# Patient Record
Sex: FEMALE | Race: BLACK OR AFRICAN AMERICAN | NOT HISPANIC OR LATINO
[De-identification: names, ages, dates, MRNs, and addresses within clinical notes are randomized per-mention and may not be internally consistent; named-entity substitution may affect disease eponyms.]

---

## 2018-02-26 PROBLEM — Z00.00 ENCOUNTER FOR PREVENTIVE HEALTH EXAMINATION: Status: ACTIVE | Noted: 2018-02-26

## 2018-03-01 ENCOUNTER — APPOINTMENT (OUTPATIENT)
Dept: ULTRASOUND IMAGING | Facility: CLINIC | Age: 28
End: 2018-03-01
Payer: MEDICAID

## 2018-03-01 ENCOUNTER — OUTPATIENT (OUTPATIENT)
Dept: OUTPATIENT SERVICES | Facility: HOSPITAL | Age: 28
LOS: 1 days | End: 2018-03-01
Payer: MEDICAID

## 2018-03-01 DIAGNOSIS — Z00.8 ENCOUNTER FOR OTHER GENERAL EXAMINATION: ICD-10-CM

## 2018-03-01 PROCEDURE — 93970 EXTREMITY STUDY: CPT | Mod: 26

## 2018-03-01 PROCEDURE — 93970 EXTREMITY STUDY: CPT

## 2019-06-11 ENCOUNTER — NON-APPOINTMENT (OUTPATIENT)
Age: 29
End: 2019-06-11

## 2019-06-11 ENCOUNTER — RECORD ABSTRACTING (OUTPATIENT)
Age: 29
End: 2019-06-11

## 2019-06-11 DIAGNOSIS — Z82.69 FAMILY HISTORY OF OTHER DISEASES OF THE MUSCULOSKELETAL SYSTEM AND CONNECTIVE TISSUE: ICD-10-CM

## 2019-06-11 DIAGNOSIS — Z87.42 PERSONAL HISTORY OF OTHER DISEASES OF THE FEMALE GENITAL TRACT: ICD-10-CM

## 2019-06-11 DIAGNOSIS — Z82.49 FAMILY HISTORY OF ISCHEMIC HEART DISEASE AND OTHER DISEASES OF THE CIRCULATORY SYSTEM: ICD-10-CM

## 2019-06-11 DIAGNOSIS — Z78.9 OTHER SPECIFIED HEALTH STATUS: ICD-10-CM

## 2019-06-11 DIAGNOSIS — Z83.3 FAMILY HISTORY OF DIABETES MELLITUS: ICD-10-CM

## 2019-06-11 RX ORDER — LABETALOL HYDROCHLORIDE 200 MG/1
200 TABLET, FILM COATED ORAL
Qty: 60 | Refills: 2 | Status: ACTIVE | COMMUNITY
Start: 2019-06-11

## 2019-06-11 RX ORDER — PNV NO.95/FERROUS FUM/FOLIC AC 28MG-0.8MG
27-0.8 TABLET ORAL
Refills: 0 | Status: ACTIVE | COMMUNITY
Start: 2019-06-11

## 2019-06-16 ENCOUNTER — LABORATORY RESULT (OUTPATIENT)
Age: 29
End: 2019-06-16

## 2019-06-17 ENCOUNTER — LABORATORY RESULT (OUTPATIENT)
Age: 29
End: 2019-06-17

## 2019-06-17 ENCOUNTER — APPOINTMENT (OUTPATIENT)
Dept: MATERNAL FETAL MEDICINE | Facility: CLINIC | Age: 29
End: 2019-06-17
Payer: COMMERCIAL

## 2019-06-17 ENCOUNTER — NON-APPOINTMENT (OUTPATIENT)
Age: 29
End: 2019-06-17

## 2019-06-17 ENCOUNTER — OUTPATIENT (OUTPATIENT)
Dept: OUTPATIENT SERVICES | Facility: HOSPITAL | Age: 29
LOS: 1 days | End: 2019-06-17
Payer: SELF-PAY

## 2019-06-17 ENCOUNTER — APPOINTMENT (OUTPATIENT)
Dept: ANTEPARTUM | Facility: CLINIC | Age: 29
End: 2019-06-17
Payer: COMMERCIAL

## 2019-06-17 ENCOUNTER — ASOB RESULT (OUTPATIENT)
Age: 29
End: 2019-06-17

## 2019-06-17 VITALS
BODY MASS INDEX: 45.99 KG/M2 | HEIGHT: 67 IN | SYSTOLIC BLOOD PRESSURE: 138 MMHG | DIASTOLIC BLOOD PRESSURE: 90 MMHG | WEIGHT: 293 LBS

## 2019-06-17 DIAGNOSIS — I89.0 LYMPHEDEMA, NOT ELSEWHERE CLASSIFIED: ICD-10-CM

## 2019-06-17 DIAGNOSIS — O09.899 SUPERVISION OF OTHER HIGH RISK PREGNANCIES, UNSPECIFIED TRIMESTER: ICD-10-CM

## 2019-06-17 PROCEDURE — 99203 OFFICE O/P NEW LOW 30 MIN: CPT | Mod: GE,25

## 2019-06-17 PROCEDURE — 81003 URINALYSIS AUTO W/O SCOPE: CPT | Mod: NC,QW

## 2019-06-17 PROCEDURE — 76802 OB US < 14 WKS ADDL FETUS: CPT | Mod: 26

## 2019-06-17 PROCEDURE — 83020 HEMOGLOBIN ELECTROPHORESIS: CPT | Mod: 26

## 2019-06-17 PROCEDURE — 76801 OB US < 14 WKS SINGLE FETUS: CPT | Mod: 26

## 2019-06-17 RX ORDER — METOCLOPRAMIDE 10 MG/1
10 TABLET ORAL
Qty: 90 | Refills: 1 | Status: ACTIVE | COMMUNITY
Start: 2019-06-17 | End: 1900-01-01

## 2019-06-18 ENCOUNTER — LABORATORY RESULT (OUTPATIENT)
Age: 29
End: 2019-06-18

## 2019-06-18 LAB
ALBUMIN SERPL ELPH-MCNC: 4 G/DL — SIGNIFICANT CHANGE UP (ref 3.3–5)
ALP SERPL-CCNC: 104 U/L — SIGNIFICANT CHANGE UP (ref 40–120)
ALT FLD-CCNC: 10 U/L — SIGNIFICANT CHANGE UP (ref 10–45)
ANION GAP SERPL CALC-SCNC: 16 MMOL/L — SIGNIFICANT CHANGE UP (ref 5–17)
AST SERPL-CCNC: 14 U/L — SIGNIFICANT CHANGE UP (ref 10–40)
BILIRUB SERPL-MCNC: 0.2 MG/DL — SIGNIFICANT CHANGE UP (ref 0.2–1.2)
BUN SERPL-MCNC: 7 MG/DL — SIGNIFICANT CHANGE UP (ref 7–23)
C TRACH RRNA SPEC QL NAA+PROBE: SIGNIFICANT CHANGE UP
CALCIUM SERPL-MCNC: 9.6 MG/DL — SIGNIFICANT CHANGE UP (ref 8.4–10.5)
CHLORIDE SERPL-SCNC: 101 MMOL/L — SIGNIFICANT CHANGE UP (ref 96–108)
CO2 SERPL-SCNC: 19 MMOL/L — LOW (ref 22–31)
CREAT ?TM UR-MCNC: 128 MG/DL — SIGNIFICANT CHANGE UP
CREAT SERPL-MCNC: 0.8 MG/DL — SIGNIFICANT CHANGE UP (ref 0.5–1.3)
GLUCOSE SERPL-MCNC: 75 MG/DL — SIGNIFICANT CHANGE UP (ref 70–99)
HBV SURFACE AG SER-ACNC: SIGNIFICANT CHANGE UP
HCT VFR BLD CALC: 38.8 % — SIGNIFICANT CHANGE UP (ref 34.5–45)
HCV AB S/CO SERPL IA: 0.1 S/CO — SIGNIFICANT CHANGE UP (ref 0–0.99)
HCV AB SERPL-IMP: SIGNIFICANT CHANGE UP
HGB BLD-MCNC: 12.5 G/DL — SIGNIFICANT CHANGE UP (ref 11.5–15.5)
HIV 1+2 AB+HIV1 P24 AG SERPL QL IA: SIGNIFICANT CHANGE UP
LDH SERPL L TO P-CCNC: 214 U/L — SIGNIFICANT CHANGE UP (ref 50–242)
MCHC RBC-ENTMCNC: 29.8 PG — SIGNIFICANT CHANGE UP (ref 27–34)
MCHC RBC-ENTMCNC: 32.2 GM/DL — SIGNIFICANT CHANGE UP (ref 32–36)
MCV RBC AUTO: 92.4 FL — SIGNIFICANT CHANGE UP (ref 80–100)
MEV IGG SER-ACNC: 106 AU/ML — SIGNIFICANT CHANGE UP
MEV IGG+IGM SER-IMP: POSITIVE — SIGNIFICANT CHANGE UP
N GONORRHOEA RRNA SPEC QL NAA+PROBE: SIGNIFICANT CHANGE UP
PLATELET # BLD AUTO: 349 K/UL — SIGNIFICANT CHANGE UP (ref 150–400)
POTASSIUM SERPL-MCNC: 4.3 MMOL/L — SIGNIFICANT CHANGE UP (ref 3.5–5.3)
POTASSIUM SERPL-SCNC: 4.3 MMOL/L — SIGNIFICANT CHANGE UP (ref 3.5–5.3)
PROT ?TM UR-MCNC: 14 MG/DL — HIGH (ref 0–12)
PROT SERPL-MCNC: 6.8 G/DL — SIGNIFICANT CHANGE UP (ref 6–8.3)
PROT/CREAT UR-RTO: 0.1 RATIO — SIGNIFICANT CHANGE UP (ref 0–0.2)
RBC # BLD: 4.2 M/UL — SIGNIFICANT CHANGE UP (ref 3.8–5.2)
RBC # FLD: 13.4 % — SIGNIFICANT CHANGE UP (ref 10.3–14.5)
RUBV IGG SER-ACNC: 2.4 INDEX — SIGNIFICANT CHANGE UP
RUBV IGG SER-IMP: POSITIVE — SIGNIFICANT CHANGE UP
SODIUM SERPL-SCNC: 136 MMOL/L — SIGNIFICANT CHANGE UP (ref 135–145)
SPECIMEN SOURCE: SIGNIFICANT CHANGE UP
T PALLIDUM AB TITR SER: NEGATIVE — SIGNIFICANT CHANGE UP
TSH SERPL-MCNC: 0.03 UIU/ML — LOW (ref 0.27–4.2)
URATE SERPL-MCNC: 3.3 MG/DL — SIGNIFICANT CHANGE UP (ref 2.5–7)
VZV IGG FLD QL IA: 200.7 INDEX — SIGNIFICANT CHANGE UP
VZV IGG FLD QL IA: POSITIVE — SIGNIFICANT CHANGE UP
WBC # BLD: 16.31 K/UL — HIGH (ref 3.8–10.5)
WBC # FLD AUTO: 16.31 K/UL — HIGH (ref 3.8–10.5)

## 2019-06-18 PROCEDURE — 84156 ASSAY OF PROTEIN URINE: CPT

## 2019-06-18 PROCEDURE — 87340 HEPATITIS B SURFACE AG IA: CPT

## 2019-06-18 PROCEDURE — 81003 URINALYSIS AUTO W/O SCOPE: CPT

## 2019-06-18 PROCEDURE — 81220 CFTR GENE COM VARIANTS: CPT

## 2019-06-18 PROCEDURE — 87086 URINE CULTURE/COLONY COUNT: CPT

## 2019-06-18 PROCEDURE — 83655 ASSAY OF LEAD: CPT

## 2019-06-18 PROCEDURE — 86803 HEPATITIS C AB TEST: CPT

## 2019-06-18 PROCEDURE — G0463: CPT

## 2019-06-18 PROCEDURE — 80053 COMPREHEN METABOLIC PANEL: CPT

## 2019-06-18 PROCEDURE — 87491 CHLMYD TRACH DNA AMP PROBE: CPT

## 2019-06-18 PROCEDURE — 85027 COMPLETE CBC AUTOMATED: CPT

## 2019-06-18 PROCEDURE — 84550 ASSAY OF BLOOD/URIC ACID: CPT

## 2019-06-18 PROCEDURE — 86780 TREPONEMA PALLIDUM: CPT

## 2019-06-18 PROCEDURE — 86765 RUBEOLA ANTIBODY: CPT

## 2019-06-18 PROCEDURE — 84443 ASSAY THYROID STIM HORMONE: CPT

## 2019-06-18 PROCEDURE — 87389 HIV-1 AG W/HIV-1&-2 AB AG IA: CPT

## 2019-06-18 PROCEDURE — 86900 BLOOD TYPING SEROLOGIC ABO: CPT

## 2019-06-18 PROCEDURE — 86480 TB TEST CELL IMMUN MEASURE: CPT

## 2019-06-18 PROCEDURE — 83020 HEMOGLOBIN ELECTROPHORESIS: CPT

## 2019-06-18 PROCEDURE — 83615 LACTATE (LD) (LDH) ENZYME: CPT

## 2019-06-18 PROCEDURE — 81329 SMN1 GENE DOS/DELETION ALYS: CPT

## 2019-06-18 PROCEDURE — 86787 VARICELLA-ZOSTER ANTIBODY: CPT

## 2019-06-18 PROCEDURE — 36415 COLL VENOUS BLD VENIPUNCTURE: CPT

## 2019-06-18 PROCEDURE — 81243 FMR1 GEN ALY DETC ABNL ALLEL: CPT

## 2019-06-18 PROCEDURE — 82570 ASSAY OF URINE CREATININE: CPT

## 2019-06-18 PROCEDURE — 86762 RUBELLA ANTIBODY: CPT

## 2019-06-18 PROCEDURE — 86850 RBC ANTIBODY SCREEN: CPT

## 2019-06-18 PROCEDURE — 87591 N.GONORRHOEAE DNA AMP PROB: CPT

## 2019-06-19 LAB
CULTURE RESULTS: SIGNIFICANT CHANGE UP
GAMMA INTERFERON BACKGROUND BLD IA-ACNC: 0.02 IU/ML — SIGNIFICANT CHANGE UP
HEMOGLOBIN INTERPRETATION: SIGNIFICANT CHANGE UP
HGB A MFR BLD: 98 % — SIGNIFICANT CHANGE UP (ref 95.8–98)
HGB A2 MFR BLD: 2 % — SIGNIFICANT CHANGE UP (ref 2–3.2)
LEAD BLD-MCNC: <1 UG/DL — SIGNIFICANT CHANGE UP (ref 0–4)
M TB IFN-G BLD-IMP: NEGATIVE — SIGNIFICANT CHANGE UP
M TB IFN-G CD4+ BCKGRND COR BLD-ACNC: 0.02 IU/ML — SIGNIFICANT CHANGE UP
M TB IFN-G CD4+CD8+ BCKGRND COR BLD-ACNC: 0.01 IU/ML — SIGNIFICANT CHANGE UP
QUANT TB PLUS MITOGEN MINUS NIL: 8.47 IU/ML — SIGNIFICANT CHANGE UP
SPECIMEN SOURCE: SIGNIFICANT CHANGE UP

## 2019-06-20 LAB — CYTOLOGY SPEC DOC CYTO: SIGNIFICANT CHANGE UP

## 2019-06-21 LAB — FRAGILE X PROTEIN (FMRP) PNL BLD: SIGNIFICANT CHANGE UP

## 2019-06-22 LAB
CYSTIC FIBROSIS EXPANDED PANEL: SIGNIFICANT CHANGE UP
SPINAL MUSCULAR ATROPHY: NEGATIVE — SIGNIFICANT CHANGE UP

## 2019-06-24 DIAGNOSIS — O10.919 UNSPECIFIED PRE-EXISTING HYPERTENSION COMPLICATING PREGNANCY, UNSPECIFIED TRIMESTER: ICD-10-CM

## 2019-06-24 DIAGNOSIS — O21.9 VOMITING OF PREGNANCY, UNSPECIFIED: ICD-10-CM

## 2019-06-30 ENCOUNTER — LABORATORY RESULT (OUTPATIENT)
Age: 29
End: 2019-06-30

## 2019-07-01 ENCOUNTER — LABORATORY RESULT (OUTPATIENT)
Age: 29
End: 2019-07-01

## 2019-07-01 ENCOUNTER — NON-APPOINTMENT (OUTPATIENT)
Age: 29
End: 2019-07-01

## 2019-07-01 ENCOUNTER — OUTPATIENT (OUTPATIENT)
Dept: OUTPATIENT SERVICES | Facility: HOSPITAL | Age: 29
LOS: 1 days | End: 2019-07-01
Payer: SELF-PAY

## 2019-07-01 ENCOUNTER — ASOB RESULT (OUTPATIENT)
Age: 29
End: 2019-07-01

## 2019-07-01 ENCOUNTER — APPOINTMENT (OUTPATIENT)
Dept: MATERNAL FETAL MEDICINE | Facility: CLINIC | Age: 29
End: 2019-07-01
Payer: COMMERCIAL

## 2019-07-01 ENCOUNTER — APPOINTMENT (OUTPATIENT)
Dept: ANTEPARTUM | Facility: CLINIC | Age: 29
End: 2019-07-01
Payer: MEDICAID

## 2019-07-01 VITALS
HEIGHT: 67 IN | WEIGHT: 293 LBS | SYSTOLIC BLOOD PRESSURE: 130 MMHG | DIASTOLIC BLOOD PRESSURE: 88 MMHG | BODY MASS INDEX: 45.99 KG/M2

## 2019-07-01 DIAGNOSIS — O10.919 UNSPECIFIED PRE-EXISTING HYPERTENSION COMPLICATING PREGNANCY, UNSPECIFIED TRIMESTER: ICD-10-CM

## 2019-07-01 DIAGNOSIS — O09.899 SUPERVISION OF OTHER HIGH RISK PREGNANCIES, UNSPECIFIED TRIMESTER: ICD-10-CM

## 2019-07-01 LAB
BILIRUB UR QL STRIP: NORMAL
GLUCOSE UR-MCNC: NORMAL
HCG UR QL: 0.2 EU/DL
HGB UR QL STRIP.AUTO: NORMAL
KETONES UR-MCNC: NORMAL
LEUKOCYTE ESTERASE UR QL STRIP: NORMAL
NITRITE UR QL STRIP: NORMAL
PH UR STRIP: 6
PROT UR STRIP-MCNC: NORMAL
SP GR UR STRIP: 1.02

## 2019-07-01 PROCEDURE — 82677 ASSAY OF ESTRIOL: CPT

## 2019-07-01 PROCEDURE — 76814 OB US NUCHAL MEAS ADD-ON: CPT | Mod: 26

## 2019-07-01 PROCEDURE — 84443 ASSAY THYROID STIM HORMONE: CPT

## 2019-07-01 PROCEDURE — 36416 COLLJ CAPILLARY BLOOD SPEC: CPT

## 2019-07-01 PROCEDURE — 76802 OB US < 14 WKS ADDL FETUS: CPT | Mod: 26

## 2019-07-01 PROCEDURE — 99213 OFFICE O/P EST LOW 20 MIN: CPT | Mod: GE,25

## 2019-07-01 PROCEDURE — 82565 ASSAY OF CREATININE: CPT

## 2019-07-01 PROCEDURE — 84702 CHORIONIC GONADOTROPIN TEST: CPT

## 2019-07-01 PROCEDURE — 84156 ASSAY OF PROTEIN URINE: CPT

## 2019-07-01 PROCEDURE — 86336 INHIBIN A: CPT

## 2019-07-01 PROCEDURE — G0463: CPT

## 2019-07-01 PROCEDURE — 76801 OB US < 14 WKS SINGLE FETUS: CPT | Mod: 26

## 2019-07-01 PROCEDURE — 82950 GLUCOSE TEST: CPT

## 2019-07-01 PROCEDURE — 82575 CREATININE CLEARANCE TEST: CPT

## 2019-07-01 PROCEDURE — 36415 COLL VENOUS BLD VENIPUNCTURE: CPT

## 2019-07-01 PROCEDURE — 84704 HCG FREE BETACHAIN TEST: CPT

## 2019-07-01 PROCEDURE — 82105 ALPHA-FETOPROTEIN SERUM: CPT

## 2019-07-01 PROCEDURE — 81003 URINALYSIS AUTO W/O SCOPE: CPT

## 2019-07-01 PROCEDURE — 80053 COMPREHEN METABOLIC PANEL: CPT

## 2019-07-01 PROCEDURE — 87086 URINE CULTURE/COLONY COUNT: CPT

## 2019-07-01 PROCEDURE — 76813 OB US NUCHAL MEAS 1 GEST: CPT | Mod: 26

## 2019-07-02 ENCOUNTER — LABORATORY RESULT (OUTPATIENT)
Age: 29
End: 2019-07-02

## 2019-07-02 LAB
ALBUMIN SERPL ELPH-MCNC: 4.1 G/DL — SIGNIFICANT CHANGE UP (ref 3.3–5)
ALP SERPL-CCNC: 103 U/L — SIGNIFICANT CHANGE UP (ref 40–120)
ALT FLD-CCNC: 12 U/L — SIGNIFICANT CHANGE UP (ref 10–45)
ANION GAP SERPL CALC-SCNC: 14 MMOL/L — SIGNIFICANT CHANGE UP (ref 5–17)
AST SERPL-CCNC: 17 U/L — SIGNIFICANT CHANGE UP (ref 10–40)
BILIRUB SERPL-MCNC: <0.2 MG/DL — SIGNIFICANT CHANGE UP (ref 0.2–1.2)
BODY SURFACE AREA CALCULATION: 1.73 M2 — SIGNIFICANT CHANGE UP
BUN SERPL-MCNC: 6 MG/DL — LOW (ref 7–23)
CALCIUM SERPL-MCNC: 9.8 MG/DL — SIGNIFICANT CHANGE UP (ref 8.4–10.5)
CHLORIDE SERPL-SCNC: 103 MMOL/L — SIGNIFICANT CHANGE UP (ref 96–108)
CO2 SERPL-SCNC: 18 MMOL/L — LOW (ref 22–31)
COLLECT DURATION TIME UR: 24 HR — SIGNIFICANT CHANGE UP
COLLECT DURATION TIME UR: 24 HR — SIGNIFICANT CHANGE UP
CREAT CL ?TM UR+SERPL-VRATE: 187 ML/MIN — HIGH (ref 75–115)
CREAT SERPL-MCNC: 0.75 MG/DL — SIGNIFICANT CHANGE UP (ref 0.5–1.3)
GLUCOSE 1H P MEAL SERPL-MCNC: 99 MG/DL — SIGNIFICANT CHANGE UP (ref 70–134)
GLUCOSE SERPL-MCNC: 98 MG/DL — SIGNIFICANT CHANGE UP (ref 70–99)
POTASSIUM SERPL-MCNC: 4.3 MMOL/L — SIGNIFICANT CHANGE UP (ref 3.5–5.3)
POTASSIUM SERPL-SCNC: 4.3 MMOL/L — SIGNIFICANT CHANGE UP (ref 3.5–5.3)
PROT 24H UR-MRATE: 231 MG/24 H — HIGH (ref 50–100)
PROT SERPL-MCNC: 6.7 G/DL — SIGNIFICANT CHANGE UP (ref 6–8.3)
SODIUM SERPL-SCNC: 135 MMOL/L — SIGNIFICANT CHANGE UP (ref 135–145)
TOTAL VOLUME - 24 HOUR: 1775 ML — SIGNIFICANT CHANGE UP
TOTAL VOLUME - 24 HOUR: 1775 ML — SIGNIFICANT CHANGE UP
TSH SERPL-MCNC: 0.28 UIU/ML — SIGNIFICANT CHANGE UP (ref 0.27–4.2)
URINE CREATININE CALCULATION: 2 G/24 H — HIGH (ref 0.8–1.8)
URINE CREATININE CALCULATION: 2 G/24 H — HIGH (ref 0.8–1.8)

## 2019-07-03 ENCOUNTER — TRANSCRIPTION ENCOUNTER (OUTPATIENT)
Age: 29
End: 2019-07-03

## 2019-07-03 LAB
1ST TRIMESTER DATA: SIGNIFICANT CHANGE UP
ADDENDUM DOC: SIGNIFICANT CHANGE UP
AFP AMN-MCNC: SIGNIFICANT CHANGE UP
B-HCG FREE SERPL-MCNC: SIGNIFICANT CHANGE UP
CLINICAL BIOCHEMIST REVIEW: SIGNIFICANT CHANGE UP
CULTURE RESULTS: SIGNIFICANT CHANGE UP
DEMOGRAPHIC DATA: SIGNIFICANT CHANGE UP
NASAL BONE - TWINB: PRESENT — SIGNIFICANT CHANGE UP
NASAL BONE: PRESENT — SIGNIFICANT CHANGE UP
NT - TWINB: SIGNIFICANT CHANGE UP
NT: SIGNIFICANT CHANGE UP
PAPP-A SERPL-ACNC: SIGNIFICANT CHANGE UP
SCREEN - COMMENTS TWIN B: SIGNIFICANT CHANGE UP
SCREEN - FOOTER TWIN B: SIGNIFICANT CHANGE UP
SCREEN - RECOMMENDATIONSTWIN B: SIGNIFICANT CHANGE UP
SCREEN-FOOTER: SIGNIFICANT CHANGE UP
SCREEN-RECOMMENDATIONS: SIGNIFICANT CHANGE UP
SPECIMEN SOURCE: SIGNIFICANT CHANGE UP

## 2019-07-09 ENCOUNTER — APPOINTMENT (OUTPATIENT)
Dept: CV DIAGNOSITCS | Facility: HOSPITAL | Age: 29
End: 2019-07-09

## 2019-07-09 ENCOUNTER — OUTPATIENT (OUTPATIENT)
Dept: OUTPATIENT SERVICES | Facility: HOSPITAL | Age: 29
LOS: 1 days | End: 2019-07-09
Payer: SELF-PAY

## 2019-07-09 DIAGNOSIS — O09.899 SUPERVISION OF OTHER HIGH RISK PREGNANCIES, UNSPECIFIED TRIMESTER: ICD-10-CM

## 2019-07-09 PROCEDURE — 93306 TTE W/DOPPLER COMPLETE: CPT

## 2019-07-09 PROCEDURE — 93306 TTE W/DOPPLER COMPLETE: CPT | Mod: 26

## 2019-07-15 ENCOUNTER — LABORATORY RESULT (OUTPATIENT)
Age: 29
End: 2019-07-15

## 2019-07-15 ENCOUNTER — OUTPATIENT (OUTPATIENT)
Dept: OUTPATIENT SERVICES | Facility: HOSPITAL | Age: 29
LOS: 1 days | End: 2019-07-15
Payer: SELF-PAY

## 2019-07-15 ENCOUNTER — APPOINTMENT (OUTPATIENT)
Dept: MATERNAL FETAL MEDICINE | Facility: CLINIC | Age: 29
End: 2019-07-15
Payer: COMMERCIAL

## 2019-07-15 ENCOUNTER — NON-APPOINTMENT (OUTPATIENT)
Age: 29
End: 2019-07-15

## 2019-07-15 DIAGNOSIS — O09.899 SUPERVISION OF OTHER HIGH RISK PREGNANCIES, UNSPECIFIED TRIMESTER: ICD-10-CM

## 2019-07-15 PROCEDURE — 99213 OFFICE O/P EST LOW 20 MIN: CPT | Mod: GE,25

## 2019-07-15 PROCEDURE — 86146 BETA-2 GLYCOPROTEIN ANTIBODY: CPT

## 2019-07-15 PROCEDURE — 36415 COLL VENOUS BLD VENIPUNCTURE: CPT

## 2019-07-15 PROCEDURE — G0463: CPT

## 2019-07-15 PROCEDURE — 85610 PROTHROMBIN TIME: CPT

## 2019-07-15 PROCEDURE — 81003 URINALYSIS AUTO W/O SCOPE: CPT

## 2019-07-15 PROCEDURE — 85598 HEXAGNAL PHOSPH PLTLT NEUTRL: CPT

## 2019-07-15 PROCEDURE — 36415 COLL VENOUS BLD VENIPUNCTURE: CPT | Mod: NC

## 2019-07-15 PROCEDURE — 86147 CARDIOLIPIN ANTIBODY EA IG: CPT

## 2019-07-15 PROCEDURE — 85730 THROMBOPLASTIN TIME PARTIAL: CPT

## 2019-07-15 PROCEDURE — 85613 RUSSELL VIPER VENOM DILUTED: CPT

## 2019-07-15 RX ORDER — PYRIDOXINE HCL (VITAMIN B6) 50 MG
50 TABLET ORAL DAILY
Qty: 30 | Refills: 2 | Status: ACTIVE | COMMUNITY
Start: 2019-06-17 | End: 1900-01-01

## 2019-07-16 LAB
APTT BLD: 29.7 SEC — SIGNIFICANT CHANGE UP (ref 27.5–36.3)
B2 GLYCOPROT1 AB SER QL: POSITIVE
CARDIOLIPIN AB SER-ACNC: NEGATIVE — SIGNIFICANT CHANGE UP
DRVVT SCREEN TO CONFIRM RATIO: SIGNIFICANT CHANGE UP
INR BLD: 1 RATIO — SIGNIFICANT CHANGE UP (ref 0.88–1.16)
LA NT DPL PPP QL: 29.1 SEC — SIGNIFICANT CHANGE UP
NORMALIZED SCT PPP-RTO: 0.96 RATIO — SIGNIFICANT CHANGE UP (ref 0–1.16)
NORMALIZED SCT PPP-RTO: SIGNIFICANT CHANGE UP
PROTHROM AB SERPL-ACNC: 11.4 SEC — SIGNIFICANT CHANGE UP (ref 10–13.1)

## 2019-07-17 ENCOUNTER — NON-APPOINTMENT (OUTPATIENT)
Age: 29
End: 2019-07-17

## 2019-07-17 LAB
B2 GLYCOPROT1 IGA SER QL: <5 SAU — SIGNIFICANT CHANGE UP
B2 GLYCOPROT1 IGG SER-ACNC: 13.5 SGU — SIGNIFICANT CHANGE UP
B2 GLYCOPROT1 IGM SER-ACNC: 6.4 SMU — SIGNIFICANT CHANGE UP

## 2019-07-22 DIAGNOSIS — O30.042 TWIN PREGNANCY, DICHORIONIC/DIAMNIOTIC, SECOND TRIMESTER: ICD-10-CM

## 2019-07-22 DIAGNOSIS — O09.92 SUPERVISION OF HIGH RISK PREGNANCY, UNSPECIFIED, SECOND TRIMESTER: ICD-10-CM

## 2019-07-22 DIAGNOSIS — O34.219 MATERNAL CARE FOR UNSPECIFIED TYPE SCAR FROM PREVIOUS CESAREAN DELIVERY: ICD-10-CM

## 2019-07-28 ENCOUNTER — LABORATORY RESULT (OUTPATIENT)
Age: 29
End: 2019-07-28

## 2019-07-29 ENCOUNTER — APPOINTMENT (OUTPATIENT)
Dept: MATERNAL FETAL MEDICINE | Facility: CLINIC | Age: 29
End: 2019-07-29
Payer: COMMERCIAL

## 2019-07-29 ENCOUNTER — OUTPATIENT (OUTPATIENT)
Dept: OUTPATIENT SERVICES | Facility: HOSPITAL | Age: 29
LOS: 1 days | End: 2019-07-29
Payer: SELF-PAY

## 2019-07-29 ENCOUNTER — ASOB RESULT (OUTPATIENT)
Age: 29
End: 2019-07-29

## 2019-07-29 ENCOUNTER — NON-APPOINTMENT (OUTPATIENT)
Age: 29
End: 2019-07-29

## 2019-07-29 ENCOUNTER — APPOINTMENT (OUTPATIENT)
Dept: ANTEPARTUM | Facility: CLINIC | Age: 29
End: 2019-07-29
Payer: COMMERCIAL

## 2019-07-29 VITALS
DIASTOLIC BLOOD PRESSURE: 82 MMHG | SYSTOLIC BLOOD PRESSURE: 120 MMHG | BODY MASS INDEX: 45.99 KG/M2 | HEART RATE: 68 BPM | HEIGHT: 67 IN | WEIGHT: 293 LBS | OXYGEN SATURATION: 98 %

## 2019-07-29 DIAGNOSIS — O09.899 SUPERVISION OF OTHER HIGH RISK PREGNANCIES, UNSPECIFIED TRIMESTER: ICD-10-CM

## 2019-07-29 DIAGNOSIS — N76.0 ACUTE VAGINITIS: ICD-10-CM

## 2019-07-29 LAB
BILIRUB UR QL STRIP: NORMAL
GLUCOSE UR-MCNC: NORMAL
HCG UR QL: 0.2 EU/DL
HGB UR QL STRIP.AUTO: NORMAL
KETONES UR-MCNC: NORMAL
LEUKOCYTE ESTERASE UR QL STRIP: NORMAL
NITRITE UR QL STRIP: NORMAL
PH UR STRIP: 6
PROT UR STRIP-MCNC: 30
SP GR UR STRIP: 1.03

## 2019-07-29 PROCEDURE — 82677 ASSAY OF ESTRIOL: CPT

## 2019-07-29 PROCEDURE — 76805 OB US >/= 14 WKS SNGL FETUS: CPT

## 2019-07-29 PROCEDURE — 84702 CHORIONIC GONADOTROPIN TEST: CPT

## 2019-07-29 PROCEDURE — 76810 OB US >/= 14 WKS ADDL FETUS: CPT | Mod: 26

## 2019-07-29 PROCEDURE — 76810 OB US >/= 14 WKS ADDL FETUS: CPT

## 2019-07-29 PROCEDURE — 99213 OFFICE O/P EST LOW 20 MIN: CPT | Mod: 25

## 2019-07-29 PROCEDURE — 84704 HCG FREE BETACHAIN TEST: CPT

## 2019-07-29 PROCEDURE — 76805 OB US >/= 14 WKS SNGL FETUS: CPT | Mod: 26

## 2019-07-29 PROCEDURE — G0463: CPT

## 2019-07-29 PROCEDURE — 36415 COLL VENOUS BLD VENIPUNCTURE: CPT

## 2019-07-29 PROCEDURE — 76817 TRANSVAGINAL US OBSTETRIC: CPT

## 2019-07-29 PROCEDURE — 86336 INHIBIN A: CPT

## 2019-07-29 PROCEDURE — 81003 URINALYSIS AUTO W/O SCOPE: CPT

## 2019-07-29 PROCEDURE — 76817 TRANSVAGINAL US OBSTETRIC: CPT | Mod: 26

## 2019-07-29 PROCEDURE — 82105 ALPHA-FETOPROTEIN SERUM: CPT

## 2019-07-29 RX ORDER — FOLIC ACID 1 MG/1
1 TABLET ORAL
Qty: 30 | Refills: 5 | Status: ACTIVE | COMMUNITY
Start: 2019-07-29 | End: 1900-01-01

## 2019-07-29 RX ORDER — AMOXICILLIN AND CLAVULANATE POTASSIUM 875; 125 MG/1; MG/1
875-125 TABLET, COATED ORAL
Qty: 14 | Refills: 0 | Status: DISCONTINUED | COMMUNITY
Start: 2019-07-15 | End: 2019-07-29

## 2019-07-29 RX ORDER — PNV NO.95/FERROUS FUM/FOLIC AC 28MG-0.8MG
500-125 TABLET ORAL DAILY
Qty: 90 | Refills: 3 | Status: ACTIVE | COMMUNITY
Start: 2019-07-29 | End: 1900-01-01

## 2019-07-29 RX ORDER — CHOLECALCIFEROL (VITAMIN D3) 10(400)/ML
160 (50 FE) DROPS ORAL
Qty: 30 | Refills: 5 | Status: ACTIVE | COMMUNITY
Start: 2019-07-29 | End: 1900-01-01

## 2019-07-30 ENCOUNTER — NON-APPOINTMENT (OUTPATIENT)
Age: 29
End: 2019-07-30

## 2019-07-31 DIAGNOSIS — O10.012 PRE-EXISTING ESSENTIAL HYPERTENSION COMPLICATING PREGNANCY, SECOND TRIMESTER: ICD-10-CM

## 2019-07-31 DIAGNOSIS — O30.042 TWIN PREGNANCY, DICHORIONIC/DIAMNIOTIC, SECOND TRIMESTER: ICD-10-CM

## 2019-07-31 DIAGNOSIS — O34.219 MATERNAL CARE FOR UNSPECIFIED TYPE SCAR FROM PREVIOUS CESAREAN DELIVERY: ICD-10-CM

## 2019-08-01 DIAGNOSIS — N76.0 ACUTE VAGINITIS: ICD-10-CM

## 2019-08-06 LAB
1ST TRIMESTER DATA: SIGNIFICANT CHANGE UP
2ND TRIMESTER DATA: SIGNIFICANT CHANGE UP
ADDENDUM DOC: SIGNIFICANT CHANGE UP
AFP AMN-MCNC: SIGNIFICANT CHANGE UP
AFP SERPL-ACNC: SIGNIFICANT CHANGE UP
B-HCG FREE SERPL-MCNC: SIGNIFICANT CHANGE UP
B-HCG FREE SERPL-MCNC: SIGNIFICANT CHANGE UP
CLINICAL BIOCHEMIST REVIEW: SIGNIFICANT CHANGE UP
DEMOGRAPHIC DATA: SIGNIFICANT CHANGE UP
INHIBIN A SERPL-MCNC: SIGNIFICANT CHANGE UP
NASAL BONE - TWINB: PRESENT — SIGNIFICANT CHANGE UP
NASAL BONE: PRESENT — SIGNIFICANT CHANGE UP
NT - TWINB: SIGNIFICANT CHANGE UP
NT: SIGNIFICANT CHANGE UP
PAPP-A SERPL-ACNC: SIGNIFICANT CHANGE UP
SCREEN - COMMENTS TWIN B: SIGNIFICANT CHANGE UP
SCREEN - FOOTER TWIN B: SIGNIFICANT CHANGE UP
SCREEN - RECOMMENDATIONSTWIN B: SIGNIFICANT CHANGE UP
SCREEN-FOOTER: SIGNIFICANT CHANGE UP
SCREEN-RECOMMENDATIONS: SIGNIFICANT CHANGE UP
TOTAL ONTDS/VWD TWIN B: SIGNIFICANT CHANGE UP
U ESTRIOL SERPL-SCNC: SIGNIFICANT CHANGE UP

## 2019-08-11 ENCOUNTER — LABORATORY RESULT (OUTPATIENT)
Age: 29
End: 2019-08-11

## 2019-08-12 ENCOUNTER — APPOINTMENT (OUTPATIENT)
Dept: ANTEPARTUM | Facility: CLINIC | Age: 29
End: 2019-08-12
Payer: COMMERCIAL

## 2019-08-12 ENCOUNTER — OUTPATIENT (OUTPATIENT)
Dept: OUTPATIENT SERVICES | Facility: HOSPITAL | Age: 29
LOS: 1 days | End: 2019-08-12
Payer: SELF-PAY

## 2019-08-12 ENCOUNTER — APPOINTMENT (OUTPATIENT)
Dept: MATERNAL FETAL MEDICINE | Facility: CLINIC | Age: 29
End: 2019-08-12
Payer: COMMERCIAL

## 2019-08-12 ENCOUNTER — ASOB RESULT (OUTPATIENT)
Age: 29
End: 2019-08-12

## 2019-08-12 ENCOUNTER — NON-APPOINTMENT (OUTPATIENT)
Age: 29
End: 2019-08-12

## 2019-08-12 VITALS
DIASTOLIC BLOOD PRESSURE: 80 MMHG | BODY MASS INDEX: 45.99 KG/M2 | SYSTOLIC BLOOD PRESSURE: 110 MMHG | WEIGHT: 293 LBS | HEIGHT: 67 IN

## 2019-08-12 DIAGNOSIS — O09.899 SUPERVISION OF OTHER HIGH RISK PREGNANCIES, UNSPECIFIED TRIMESTER: ICD-10-CM

## 2019-08-12 PROCEDURE — 99213 OFFICE O/P EST LOW 20 MIN: CPT | Mod: 25

## 2019-08-12 PROCEDURE — 81003 URINALYSIS AUTO W/O SCOPE: CPT

## 2019-08-12 PROCEDURE — 76816 OB US FOLLOW-UP PER FETUS: CPT

## 2019-08-12 PROCEDURE — G0463: CPT

## 2019-08-12 PROCEDURE — 87140 CULTURE TYPE IMMUNOFLUORESC: CPT

## 2019-08-12 PROCEDURE — 76816 OB US FOLLOW-UP PER FETUS: CPT | Mod: 26,59

## 2019-08-12 PROCEDURE — 87255 GENET VIRUS ISOLATE HSV: CPT

## 2019-08-13 ENCOUNTER — NON-APPOINTMENT (OUTPATIENT)
Age: 29
End: 2019-08-13

## 2019-08-13 LAB
BILIRUB UR QL STRIP: NORMAL
GLUCOSE UR-MCNC: NORMAL
HCG UR QL: 0.2 EU/DL
HGB UR QL STRIP.AUTO: NORMAL
KETONES UR-MCNC: NORMAL
LEUKOCYTE ESTERASE UR QL STRIP: NORMAL
NITRITE UR QL STRIP: NORMAL
PH UR STRIP: 6.5
PROT UR STRIP-MCNC: NORMAL
SP GR UR STRIP: 1.02

## 2019-08-14 DIAGNOSIS — O30.049 TWIN PREGNANCY, DICHORIONIC/DIAMNIOTIC, UNSPECIFIED TRIMESTER: ICD-10-CM

## 2019-08-14 DIAGNOSIS — O10.919 UNSPECIFIED PRE-EXISTING HYPERTENSION COMPLICATING PREGNANCY, UNSPECIFIED TRIMESTER: ICD-10-CM

## 2019-08-15 LAB — HHV SPEC CULT: ABNORMAL

## 2019-08-19 ENCOUNTER — RX RENEWAL (OUTPATIENT)
Age: 29
End: 2019-08-19

## 2019-08-19 RX ORDER — VALACYCLOVIR 1 G/1
1 TABLET, FILM COATED ORAL
Qty: 14 | Refills: 3 | Status: ACTIVE | COMMUNITY
Start: 2019-08-19 | End: 1900-01-01

## 2019-08-20 ENCOUNTER — RESULT REVIEW (OUTPATIENT)
Age: 29
End: 2019-08-20

## 2019-08-22 DIAGNOSIS — Z3A.18 18 WEEKS GESTATION OF PREGNANCY: ICD-10-CM

## 2019-08-26 ENCOUNTER — APPOINTMENT (OUTPATIENT)
Dept: MATERNAL FETAL MEDICINE | Facility: CLINIC | Age: 29
End: 2019-08-26

## 2019-08-26 ENCOUNTER — ASOB RESULT (OUTPATIENT)
Age: 29
End: 2019-08-26

## 2019-08-26 ENCOUNTER — APPOINTMENT (OUTPATIENT)
Dept: ANTEPARTUM | Facility: CLINIC | Age: 29
End: 2019-08-26
Payer: SELF-PAY

## 2019-08-26 PROCEDURE — 76812 OB US DETAILED ADDL FETUS: CPT

## 2019-08-26 PROCEDURE — 76811 OB US DETAILED SNGL FETUS: CPT

## 2019-08-26 PROCEDURE — 76817 TRANSVAGINAL US OBSTETRIC: CPT

## 2019-09-09 ENCOUNTER — NON-APPOINTMENT (OUTPATIENT)
Age: 29
End: 2019-09-09

## 2019-09-09 ENCOUNTER — APPOINTMENT (OUTPATIENT)
Dept: ANTEPARTUM | Facility: CLINIC | Age: 29
End: 2019-09-09
Payer: SELF-PAY

## 2019-09-09 ENCOUNTER — ASOB RESULT (OUTPATIENT)
Age: 29
End: 2019-09-09

## 2019-09-09 ENCOUNTER — OUTPATIENT (OUTPATIENT)
Dept: OUTPATIENT SERVICES | Facility: HOSPITAL | Age: 29
LOS: 1 days | End: 2019-09-09
Payer: SELF-PAY

## 2019-09-09 ENCOUNTER — APPOINTMENT (OUTPATIENT)
Dept: MATERNAL FETAL MEDICINE | Facility: CLINIC | Age: 29
End: 2019-09-09
Payer: SELF-PAY

## 2019-09-09 ENCOUNTER — LABORATORY RESULT (OUTPATIENT)
Age: 29
End: 2019-09-09

## 2019-09-09 VITALS
BODY MASS INDEX: 45.99 KG/M2 | HEART RATE: 89 BPM | WEIGHT: 293 LBS | SYSTOLIC BLOOD PRESSURE: 122 MMHG | DIASTOLIC BLOOD PRESSURE: 82 MMHG | OXYGEN SATURATION: 97 % | HEIGHT: 67 IN

## 2019-09-09 DIAGNOSIS — O30.049 TWIN PREGNANCY, DICHORIONIC/DIAMNIOTIC, UNSPECIFIED TRIMESTER: ICD-10-CM

## 2019-09-09 DIAGNOSIS — Z3A.18 18 WEEKS GESTATION OF PREGNANCY: ICD-10-CM

## 2019-09-09 DIAGNOSIS — O09.899 SUPERVISION OF OTHER HIGH RISK PREGNANCIES, UNSPECIFIED TRIMESTER: ICD-10-CM

## 2019-09-09 LAB
BILIRUB UR QL STRIP: NORMAL
GLUCOSE UR-MCNC: NORMAL
HCG UR QL: 0.2 EU/DL
HGB UR QL STRIP.AUTO: NORMAL
KETONES UR-MCNC: NORMAL
LEUKOCYTE ESTERASE UR QL STRIP: NORMAL
NITRITE UR QL STRIP: NORMAL
PH UR STRIP: 6
PROT UR STRIP-MCNC: NORMAL
SP GR UR STRIP: 1.03
T4 FREE+ TSH PNL SERPL: 1 UIU/ML — SIGNIFICANT CHANGE UP (ref 0.27–4.2)

## 2019-09-09 PROCEDURE — G0463: CPT

## 2019-09-09 PROCEDURE — 99213 OFFICE O/P EST LOW 20 MIN: CPT | Mod: GC,25

## 2019-09-09 PROCEDURE — 76815 OB US LIMITED FETUS(S): CPT

## 2019-09-09 PROCEDURE — 81003 URINALYSIS AUTO W/O SCOPE: CPT

## 2019-09-09 PROCEDURE — 84443 ASSAY THYROID STIM HORMONE: CPT

## 2019-09-09 PROCEDURE — 76815 OB US LIMITED FETUS(S): CPT | Mod: 26

## 2019-09-11 ENCOUNTER — NON-APPOINTMENT (OUTPATIENT)
Age: 29
End: 2019-09-11

## 2019-09-19 DIAGNOSIS — O98.319 OTHER INFECTIONS WITH A PREDOMINANTLY SEXUAL MODE OF TRANSMISSION COMPLICATING PREGNANCY, UNSPECIFIED TRIMESTER: ICD-10-CM

## 2019-09-23 ENCOUNTER — APPOINTMENT (OUTPATIENT)
Dept: MATERNAL FETAL MEDICINE | Facility: CLINIC | Age: 29
End: 2019-09-23
Payer: COMMERCIAL

## 2019-09-23 ENCOUNTER — ASOB RESULT (OUTPATIENT)
Age: 29
End: 2019-09-23

## 2019-09-23 ENCOUNTER — OUTPATIENT (OUTPATIENT)
Dept: OUTPATIENT SERVICES | Facility: HOSPITAL | Age: 29
LOS: 1 days | End: 2019-09-23
Payer: SELF-PAY

## 2019-09-23 ENCOUNTER — APPOINTMENT (OUTPATIENT)
Dept: ANTEPARTUM | Facility: CLINIC | Age: 29
End: 2019-09-23
Payer: COMMERCIAL

## 2019-09-23 ENCOUNTER — NON-APPOINTMENT (OUTPATIENT)
Age: 29
End: 2019-09-23

## 2019-09-23 VITALS
OXYGEN SATURATION: 98 % | DIASTOLIC BLOOD PRESSURE: 80 MMHG | WEIGHT: 293 LBS | BODY MASS INDEX: 45.99 KG/M2 | HEIGHT: 67 IN | SYSTOLIC BLOOD PRESSURE: 122 MMHG | HEART RATE: 86 BPM

## 2019-09-23 DIAGNOSIS — O09.899 SUPERVISION OF OTHER HIGH RISK PREGNANCIES, UNSPECIFIED TRIMESTER: ICD-10-CM

## 2019-09-23 LAB
BILIRUB UR QL STRIP: NORMAL
GLUCOSE UR-MCNC: NORMAL
HCG UR QL: 0.2 EU/DL
HGB UR QL STRIP.AUTO: NORMAL
KETONES UR-MCNC: NORMAL
LEUKOCYTE ESTERASE UR QL STRIP: NORMAL
NITRITE UR QL STRIP: NORMAL
PH UR STRIP: 5.5
PROT UR STRIP-MCNC: NORMAL
SP GR UR STRIP: 1.01

## 2019-09-23 PROCEDURE — 76816 OB US FOLLOW-UP PER FETUS: CPT | Mod: 59

## 2019-09-23 PROCEDURE — 99213 OFFICE O/P EST LOW 20 MIN: CPT | Mod: GE,25

## 2019-09-23 PROCEDURE — 81003 URINALYSIS AUTO W/O SCOPE: CPT

## 2019-09-23 PROCEDURE — G0463: CPT

## 2019-09-23 PROCEDURE — 76816 OB US FOLLOW-UP PER FETUS: CPT | Mod: 26

## 2019-09-30 DIAGNOSIS — O30.042 TWIN PREGNANCY, DICHORIONIC/DIAMNIOTIC, SECOND TRIMESTER: ICD-10-CM

## 2019-09-30 DIAGNOSIS — I10 ESSENTIAL (PRIMARY) HYPERTENSION: ICD-10-CM

## 2019-09-30 DIAGNOSIS — O09.522 SUPERVISION OF ELDERLY MULTIGRAVIDA, SECOND TRIMESTER: ICD-10-CM

## 2019-10-07 ENCOUNTER — NON-APPOINTMENT (OUTPATIENT)
Age: 29
End: 2019-10-07

## 2019-10-07 ENCOUNTER — LABORATORY RESULT (OUTPATIENT)
Age: 29
End: 2019-10-07

## 2019-10-07 ENCOUNTER — OUTPATIENT (OUTPATIENT)
Dept: OUTPATIENT SERVICES | Facility: HOSPITAL | Age: 29
LOS: 1 days | End: 2019-10-07
Payer: COMMERCIAL

## 2019-10-07 ENCOUNTER — APPOINTMENT (OUTPATIENT)
Dept: MATERNAL FETAL MEDICINE | Facility: CLINIC | Age: 29
End: 2019-10-07
Payer: COMMERCIAL

## 2019-10-07 VITALS
WEIGHT: 293 LBS | DIASTOLIC BLOOD PRESSURE: 70 MMHG | HEART RATE: 78 BPM | HEIGHT: 67 IN | BODY MASS INDEX: 45.99 KG/M2 | SYSTOLIC BLOOD PRESSURE: 108 MMHG

## 2019-10-07 DIAGNOSIS — O09.899 SUPERVISION OF OTHER HIGH RISK PREGNANCIES, UNSPECIFIED TRIMESTER: ICD-10-CM

## 2019-10-07 LAB
BILIRUB UR QL STRIP: NORMAL
GLUCOSE UR-MCNC: NORMAL
HCG UR QL: 0.2 EU/DL
HCT VFR BLD CALC: 35.5 % — SIGNIFICANT CHANGE UP (ref 34.5–45)
HGB BLD-MCNC: 11.5 G/DL — SIGNIFICANT CHANGE UP (ref 11.5–15.5)
HGB UR QL STRIP.AUTO: NORMAL
KETONES UR-MCNC: NORMAL
LEUKOCYTE ESTERASE UR QL STRIP: NORMAL
MCHC RBC-ENTMCNC: 30.2 PG — SIGNIFICANT CHANGE UP (ref 27–34)
MCHC RBC-ENTMCNC: 32.4 GM/DL — SIGNIFICANT CHANGE UP (ref 32–36)
MCV RBC AUTO: 93.2 FL — SIGNIFICANT CHANGE UP (ref 80–100)
NITRITE UR QL STRIP: NORMAL
PH UR STRIP: 6.5
PLATELET # BLD AUTO: 333 K/UL — SIGNIFICANT CHANGE UP (ref 150–400)
PROT UR STRIP-MCNC: NORMAL
RBC # BLD: 3.81 M/UL — SIGNIFICANT CHANGE UP (ref 3.8–5.2)
RBC # FLD: 13.8 % — SIGNIFICANT CHANGE UP (ref 10.3–14.5)
SP GR UR STRIP: 1.02
WBC # BLD: 14.07 K/UL — HIGH (ref 3.8–10.5)
WBC # FLD AUTO: 14.07 K/UL — HIGH (ref 3.8–10.5)

## 2019-10-07 PROCEDURE — 82950 GLUCOSE TEST: CPT

## 2019-10-07 PROCEDURE — 36415 COLL VENOUS BLD VENIPUNCTURE: CPT

## 2019-10-07 PROCEDURE — 99213 OFFICE O/P EST LOW 20 MIN: CPT | Mod: GC,25

## 2019-10-07 PROCEDURE — 85027 COMPLETE CBC AUTOMATED: CPT

## 2019-10-07 PROCEDURE — 81003 URINALYSIS AUTO W/O SCOPE: CPT

## 2019-10-07 PROCEDURE — G0463: CPT | Mod: 25

## 2019-10-07 PROCEDURE — 90471 IMMUNIZATION ADMIN: CPT | Mod: NC

## 2019-10-07 PROCEDURE — 90471 IMMUNIZATION ADMIN: CPT

## 2019-10-07 PROCEDURE — 86850 RBC ANTIBODY SCREEN: CPT

## 2019-10-07 PROCEDURE — 90656 IIV3 VACC NO PRSV 0.5 ML IM: CPT

## 2019-10-07 PROCEDURE — 86900 BLOOD TYPING SEROLOGIC ABO: CPT

## 2019-10-08 LAB — GLUCOSE 1H P MEAL SERPL-MCNC: 122 MG/DL — SIGNIFICANT CHANGE UP (ref 70–134)

## 2019-10-12 ENCOUNTER — NON-APPOINTMENT (OUTPATIENT)
Age: 29
End: 2019-10-12

## 2019-10-21 ENCOUNTER — APPOINTMENT (OUTPATIENT)
Dept: MATERNAL FETAL MEDICINE | Facility: CLINIC | Age: 29
End: 2019-10-21
Payer: COMMERCIAL

## 2019-10-21 ENCOUNTER — APPOINTMENT (OUTPATIENT)
Dept: ANTEPARTUM | Facility: CLINIC | Age: 29
End: 2019-10-21
Payer: COMMERCIAL

## 2019-10-21 ENCOUNTER — ASOB RESULT (OUTPATIENT)
Age: 29
End: 2019-10-21

## 2019-10-21 ENCOUNTER — OUTPATIENT (OUTPATIENT)
Dept: OUTPATIENT SERVICES | Facility: HOSPITAL | Age: 29
LOS: 1 days | End: 2019-10-21
Payer: COMMERCIAL

## 2019-10-21 VITALS
DIASTOLIC BLOOD PRESSURE: 80 MMHG | BODY MASS INDEX: 45.99 KG/M2 | HEIGHT: 67 IN | OXYGEN SATURATION: 98 % | WEIGHT: 293 LBS | HEART RATE: 97 BPM | SYSTOLIC BLOOD PRESSURE: 122 MMHG

## 2019-10-21 DIAGNOSIS — O09.899 SUPERVISION OF OTHER HIGH RISK PREGNANCIES, UNSPECIFIED TRIMESTER: ICD-10-CM

## 2019-10-21 PROCEDURE — 76816 OB US FOLLOW-UP PER FETUS: CPT | Mod: 26

## 2019-10-21 PROCEDURE — 81003 URINALYSIS AUTO W/O SCOPE: CPT

## 2019-10-21 PROCEDURE — 99213 OFFICE O/P EST LOW 20 MIN: CPT | Mod: 25

## 2019-10-21 PROCEDURE — G0463: CPT | Mod: 25

## 2019-10-21 PROCEDURE — 76816 OB US FOLLOW-UP PER FETUS: CPT

## 2019-10-22 DIAGNOSIS — O09.293 SUPERVISION OF PREGNANCY WITH OTHER POOR REPRODUCTIVE OR OBSTETRIC HISTORY, THIRD TRIMESTER: ICD-10-CM

## 2019-10-22 DIAGNOSIS — O10.919 UNSPECIFIED PRE-EXISTING HYPERTENSION COMPLICATING PREGNANCY, UNSPECIFIED TRIMESTER: ICD-10-CM

## 2019-10-28 DIAGNOSIS — O99.213 OBESITY COMPLICATING PREGNANCY, THIRD TRIMESTER: ICD-10-CM

## 2019-10-28 DIAGNOSIS — O30.043 TWIN PREGNANCY, DICHORIONIC/DIAMNIOTIC, THIRD TRIMESTER: ICD-10-CM

## 2019-11-05 ENCOUNTER — APPOINTMENT (OUTPATIENT)
Dept: MATERNAL FETAL MEDICINE | Facility: CLINIC | Age: 29
End: 2019-11-05
Payer: COMMERCIAL

## 2019-11-05 ENCOUNTER — OUTPATIENT (OUTPATIENT)
Dept: OUTPATIENT SERVICES | Facility: HOSPITAL | Age: 29
LOS: 1 days | End: 2019-11-05
Payer: COMMERCIAL

## 2019-11-05 VITALS
BODY MASS INDEX: 45.99 KG/M2 | SYSTOLIC BLOOD PRESSURE: 110 MMHG | DIASTOLIC BLOOD PRESSURE: 84 MMHG | WEIGHT: 293 LBS | HEIGHT: 67 IN | OXYGEN SATURATION: 98 % | HEART RATE: 96 BPM

## 2019-11-05 VITALS
SYSTOLIC BLOOD PRESSURE: 110 MMHG | HEART RATE: 96 BPM | HEIGHT: 67 IN | DIASTOLIC BLOOD PRESSURE: 84 MMHG | WEIGHT: 293 LBS | OXYGEN SATURATION: 98 % | BODY MASS INDEX: 45.99 KG/M2

## 2019-11-05 DIAGNOSIS — O09.899 SUPERVISION OF OTHER HIGH RISK PREGNANCIES, UNSPECIFIED TRIMESTER: ICD-10-CM

## 2019-11-05 PROCEDURE — 90471 IMMUNIZATION ADMIN: CPT

## 2019-11-05 PROCEDURE — 99213 OFFICE O/P EST LOW 20 MIN: CPT | Mod: 25,GE

## 2019-11-05 PROCEDURE — 90715 TDAP VACCINE 7 YRS/> IM: CPT

## 2019-11-05 PROCEDURE — 81003 URINALYSIS AUTO W/O SCOPE: CPT

## 2019-11-05 PROCEDURE — G0463: CPT

## 2019-11-05 PROCEDURE — 90471 IMMUNIZATION ADMIN: CPT | Mod: NC

## 2019-11-11 DIAGNOSIS — I10 ESSENTIAL (PRIMARY) HYPERTENSION: ICD-10-CM

## 2019-11-11 DIAGNOSIS — O09.299 SUPERVISION OF PREGNANCY WITH OTHER POOR REPRODUCTIVE OR OBSTETRIC HISTORY, UNSPECIFIED TRIMESTER: ICD-10-CM

## 2019-11-11 DIAGNOSIS — Z23 ENCOUNTER FOR IMMUNIZATION: ICD-10-CM

## 2019-11-11 DIAGNOSIS — O09.90 SUPERVISION OF HIGH RISK PREGNANCY, UNSPECIFIED, UNSPECIFIED TRIMESTER: ICD-10-CM

## 2019-11-19 ENCOUNTER — APPOINTMENT (OUTPATIENT)
Dept: MATERNAL FETAL MEDICINE | Facility: CLINIC | Age: 29
End: 2019-11-19
Payer: COMMERCIAL

## 2019-11-19 ENCOUNTER — APPOINTMENT (OUTPATIENT)
Dept: ANTEPARTUM | Facility: CLINIC | Age: 29
End: 2019-11-19
Payer: COMMERCIAL

## 2019-11-19 ENCOUNTER — APPOINTMENT (OUTPATIENT)
Dept: ANTEPARTUM | Facility: CLINIC | Age: 29
End: 2019-11-19

## 2019-11-19 ENCOUNTER — ASOB RESULT (OUTPATIENT)
Age: 29
End: 2019-11-19

## 2019-11-19 ENCOUNTER — OUTPATIENT (OUTPATIENT)
Dept: OUTPATIENT SERVICES | Facility: HOSPITAL | Age: 29
LOS: 1 days | End: 2019-11-19
Payer: COMMERCIAL

## 2019-11-19 VITALS
SYSTOLIC BLOOD PRESSURE: 120 MMHG | HEIGHT: 67 IN | BODY MASS INDEX: 45.99 KG/M2 | WEIGHT: 293 LBS | HEART RATE: 90 BPM | OXYGEN SATURATION: 97 % | DIASTOLIC BLOOD PRESSURE: 80 MMHG

## 2019-11-19 DIAGNOSIS — O09.899 SUPERVISION OF OTHER HIGH RISK PREGNANCIES, UNSPECIFIED TRIMESTER: ICD-10-CM

## 2019-11-19 PROCEDURE — 99213 OFFICE O/P EST LOW 20 MIN: CPT | Mod: GE

## 2019-11-19 PROCEDURE — G0463: CPT

## 2019-11-19 PROCEDURE — 76816 OB US FOLLOW-UP PER FETUS: CPT | Mod: 26

## 2019-11-19 PROCEDURE — 81003 URINALYSIS AUTO W/O SCOPE: CPT

## 2019-11-19 PROCEDURE — 76816 OB US FOLLOW-UP PER FETUS: CPT | Mod: 59

## 2019-11-19 RX ORDER — FAMOTIDINE 20 MG/1
20 TABLET, FILM COATED ORAL DAILY
Qty: 30 | Refills: 2 | Status: ACTIVE | COMMUNITY
Start: 2019-11-19 | End: 1900-01-01

## 2019-11-19 RX ORDER — TERCONAZOLE 4 MG/G
0.4 CREAM VAGINAL
Qty: 1 | Refills: 1 | Status: DISCONTINUED | COMMUNITY
Start: 2019-07-29 | End: 2019-11-19

## 2019-11-19 RX ORDER — DOXYLAMINE SUCCINATE 25 MG/1
25 TABLET ORAL
Qty: 30 | Refills: 1 | Status: DISCONTINUED | COMMUNITY
Start: 2019-06-17 | End: 2019-11-19

## 2019-11-25 ENCOUNTER — ASOB RESULT (OUTPATIENT)
Age: 29
End: 2019-11-25

## 2019-11-25 ENCOUNTER — APPOINTMENT (OUTPATIENT)
Dept: ANTEPARTUM | Facility: CLINIC | Age: 29
End: 2019-11-25
Payer: COMMERCIAL

## 2019-11-25 ENCOUNTER — OUTPATIENT (OUTPATIENT)
Dept: OUTPATIENT SERVICES | Facility: HOSPITAL | Age: 29
LOS: 1 days | End: 2019-11-25
Payer: COMMERCIAL

## 2019-11-25 PROCEDURE — 76818 FETAL BIOPHYS PROFILE W/NST: CPT

## 2019-11-25 PROCEDURE — 76818 FETAL BIOPHYS PROFILE W/NST: CPT | Mod: 26

## 2019-11-25 PROCEDURE — 76818 FETAL BIOPHYS PROFILE W/NST: CPT | Mod: 26,59

## 2019-12-03 ENCOUNTER — OUTPATIENT (OUTPATIENT)
Dept: OUTPATIENT SERVICES | Facility: HOSPITAL | Age: 29
LOS: 1 days | End: 2019-12-03
Payer: COMMERCIAL

## 2019-12-03 ENCOUNTER — APPOINTMENT (OUTPATIENT)
Dept: ANTEPARTUM | Facility: CLINIC | Age: 29
End: 2019-12-03
Payer: COMMERCIAL

## 2019-12-03 ENCOUNTER — LABORATORY RESULT (OUTPATIENT)
Age: 29
End: 2019-12-03

## 2019-12-03 ENCOUNTER — ASOB RESULT (OUTPATIENT)
Age: 29
End: 2019-12-03

## 2019-12-03 ENCOUNTER — APPOINTMENT (OUTPATIENT)
Dept: MATERNAL FETAL MEDICINE | Facility: CLINIC | Age: 29
End: 2019-12-03
Payer: COMMERCIAL

## 2019-12-03 VITALS
BODY MASS INDEX: 45.99 KG/M2 | OXYGEN SATURATION: 98 % | SYSTOLIC BLOOD PRESSURE: 122 MMHG | WEIGHT: 293 LBS | HEIGHT: 67 IN | HEART RATE: 86 BPM | DIASTOLIC BLOOD PRESSURE: 90 MMHG

## 2019-12-03 DIAGNOSIS — O09.93 SUPERVISION OF HIGH RISK PREGNANCY, UNSPECIFIED, THIRD TRIMESTER: ICD-10-CM

## 2019-12-03 DIAGNOSIS — O09.899 SUPERVISION OF OTHER HIGH RISK PREGNANCIES, UNSPECIFIED TRIMESTER: ICD-10-CM

## 2019-12-03 PROCEDURE — 84550 ASSAY OF BLOOD/URIC ACID: CPT

## 2019-12-03 PROCEDURE — 76816 OB US FOLLOW-UP PER FETUS: CPT | Mod: 59

## 2019-12-03 PROCEDURE — 83615 LACTATE (LD) (LDH) ENZYME: CPT

## 2019-12-03 PROCEDURE — 81003 URINALYSIS AUTO W/O SCOPE: CPT

## 2019-12-03 PROCEDURE — 80053 COMPREHEN METABOLIC PANEL: CPT

## 2019-12-03 PROCEDURE — G0463: CPT | Mod: 25

## 2019-12-03 PROCEDURE — 85027 COMPLETE CBC AUTOMATED: CPT

## 2019-12-03 PROCEDURE — 99213 OFFICE O/P EST LOW 20 MIN: CPT | Mod: 25

## 2019-12-03 PROCEDURE — 76816 OB US FOLLOW-UP PER FETUS: CPT | Mod: 26,59

## 2019-12-04 LAB
ALBUMIN SERPL ELPH-MCNC: 3.2 G/DL — LOW (ref 3.3–5)
ALP SERPL-CCNC: 144 U/L — HIGH (ref 40–120)
ALT FLD-CCNC: 16 U/L — SIGNIFICANT CHANGE UP (ref 10–45)
ANION GAP SERPL CALC-SCNC: 15 MMOL/L — SIGNIFICANT CHANGE UP (ref 5–17)
AST SERPL-CCNC: 22 U/L — SIGNIFICANT CHANGE UP (ref 10–40)
BILIRUB SERPL-MCNC: 0.2 MG/DL — SIGNIFICANT CHANGE UP (ref 0.2–1.2)
BUN SERPL-MCNC: 6 MG/DL — LOW (ref 7–23)
CALCIUM SERPL-MCNC: 9.1 MG/DL — SIGNIFICANT CHANGE UP (ref 8.4–10.5)
CHLORIDE SERPL-SCNC: 105 MMOL/L — SIGNIFICANT CHANGE UP (ref 96–108)
CO2 SERPL-SCNC: 18 MMOL/L — LOW (ref 22–31)
CREAT SERPL-MCNC: 0.84 MG/DL — SIGNIFICANT CHANGE UP (ref 0.5–1.3)
GLUCOSE SERPL-MCNC: 67 MG/DL — LOW (ref 70–99)
HCT VFR BLD CALC: 37.9 % — SIGNIFICANT CHANGE UP (ref 34.5–45)
HGB BLD-MCNC: 12.3 G/DL — SIGNIFICANT CHANGE UP (ref 11.5–15.5)
LDH SERPL L TO P-CCNC: 230 U/L — SIGNIFICANT CHANGE UP (ref 50–242)
MCHC RBC-ENTMCNC: 30.2 PG — SIGNIFICANT CHANGE UP (ref 27–34)
MCHC RBC-ENTMCNC: 32.5 GM/DL — SIGNIFICANT CHANGE UP (ref 32–36)
MCV RBC AUTO: 93.1 FL — SIGNIFICANT CHANGE UP (ref 80–100)
PLATELET # BLD AUTO: 294 K/UL — SIGNIFICANT CHANGE UP (ref 150–400)
POTASSIUM SERPL-MCNC: 4.1 MMOL/L — SIGNIFICANT CHANGE UP (ref 3.5–5.3)
POTASSIUM SERPL-SCNC: 4.1 MMOL/L — SIGNIFICANT CHANGE UP (ref 3.5–5.3)
PROT SERPL-MCNC: 5.6 G/DL — LOW (ref 6–8.3)
RBC # BLD: 4.07 M/UL — SIGNIFICANT CHANGE UP (ref 3.8–5.2)
RBC # FLD: 13.7 % — SIGNIFICANT CHANGE UP (ref 10.3–14.5)
SODIUM SERPL-SCNC: 138 MMOL/L — SIGNIFICANT CHANGE UP (ref 135–145)
URATE SERPL-MCNC: 5.8 MG/DL — SIGNIFICANT CHANGE UP (ref 2.5–7)
WBC # BLD: 11.79 K/UL — HIGH (ref 3.8–10.5)
WBC # FLD AUTO: 11.79 K/UL — HIGH (ref 3.8–10.5)

## 2019-12-09 DIAGNOSIS — O10.013 PRE-EXISTING ESSENTIAL HYPERTENSION COMPLICATING PREGNANCY, THIRD TRIMESTER: ICD-10-CM

## 2019-12-09 DIAGNOSIS — Z3A.32 32 WEEKS GESTATION OF PREGNANCY: ICD-10-CM

## 2019-12-09 DIAGNOSIS — O09.293 SUPERVISION OF PREGNANCY WITH OTHER POOR REPRODUCTIVE OR OBSTETRIC HISTORY, THIRD TRIMESTER: ICD-10-CM

## 2019-12-09 DIAGNOSIS — O30.043 TWIN PREGNANCY, DICHORIONIC/DIAMNIOTIC, THIRD TRIMESTER: ICD-10-CM

## 2019-12-10 ENCOUNTER — OUTPATIENT (OUTPATIENT)
Dept: OUTPATIENT SERVICES | Facility: HOSPITAL | Age: 29
LOS: 1 days | End: 2019-12-10
Payer: COMMERCIAL

## 2019-12-10 ENCOUNTER — APPOINTMENT (OUTPATIENT)
Dept: ULTRASOUND IMAGING | Facility: IMAGING CENTER | Age: 29
End: 2019-12-10
Payer: COMMERCIAL

## 2019-12-10 ENCOUNTER — LABORATORY RESULT (OUTPATIENT)
Age: 29
End: 2019-12-10

## 2019-12-10 ENCOUNTER — APPOINTMENT (OUTPATIENT)
Dept: MATERNAL FETAL MEDICINE | Facility: CLINIC | Age: 29
End: 2019-12-10
Payer: COMMERCIAL

## 2019-12-10 VITALS
WEIGHT: 293 LBS | HEIGHT: 67 IN | BODY MASS INDEX: 45.99 KG/M2 | DIASTOLIC BLOOD PRESSURE: 80 MMHG | HEART RATE: 92 BPM | SYSTOLIC BLOOD PRESSURE: 124 MMHG | OXYGEN SATURATION: 98 %

## 2019-12-10 DIAGNOSIS — O09.90 SUPERVISION OF HIGH RISK PREGNANCY, UNSPECIFIED, UNSPECIFIED TRIMESTER: ICD-10-CM

## 2019-12-10 DIAGNOSIS — O09.899 SUPERVISION OF OTHER HIGH RISK PREGNANCIES, UNSPECIFIED TRIMESTER: ICD-10-CM

## 2019-12-10 LAB
HCT VFR BLD CALC: 38.6 % — SIGNIFICANT CHANGE UP (ref 34.5–45)
HGB BLD-MCNC: 12.9 G/DL — SIGNIFICANT CHANGE UP (ref 11.5–15.5)
MCHC RBC-ENTMCNC: 30.8 PG — SIGNIFICANT CHANGE UP (ref 27–34)
MCHC RBC-ENTMCNC: 33.4 GM/DL — SIGNIFICANT CHANGE UP (ref 32–36)
MCV RBC AUTO: 92.1 FL — SIGNIFICANT CHANGE UP (ref 80–100)
PLATELET # BLD AUTO: 292 K/UL — SIGNIFICANT CHANGE UP (ref 150–400)
RBC # BLD: 4.19 M/UL — SIGNIFICANT CHANGE UP (ref 3.8–5.2)
RBC # FLD: 13.8 % — SIGNIFICANT CHANGE UP (ref 10.3–14.5)
WBC # BLD: 12.5 K/UL — HIGH (ref 3.8–10.5)
WBC # FLD AUTO: 12.5 K/UL — HIGH (ref 3.8–10.5)

## 2019-12-10 PROCEDURE — 86780 TREPONEMA PALLIDUM: CPT

## 2019-12-10 PROCEDURE — 87389 HIV-1 AG W/HIV-1&-2 AB AG IA: CPT

## 2019-12-10 PROCEDURE — 93970 EXTREMITY STUDY: CPT | Mod: 26

## 2019-12-10 PROCEDURE — 93970 EXTREMITY STUDY: CPT

## 2019-12-10 PROCEDURE — G0463: CPT | Mod: 25

## 2019-12-10 PROCEDURE — 87653 STREP B DNA AMP PROBE: CPT

## 2019-12-10 PROCEDURE — 85027 COMPLETE CBC AUTOMATED: CPT

## 2019-12-10 PROCEDURE — 81003 URINALYSIS AUTO W/O SCOPE: CPT

## 2019-12-10 PROCEDURE — 99213 OFFICE O/P EST LOW 20 MIN: CPT | Mod: 25,GE

## 2019-12-10 RX ORDER — VALACYCLOVIR 500 MG/1
500 TABLET, FILM COATED ORAL TWICE DAILY
Qty: 45 | Refills: 0 | Status: ACTIVE | COMMUNITY
Start: 2019-12-10 | End: 1900-01-01

## 2019-12-11 LAB
BILIRUB UR QL STRIP: NORMAL
GLUCOSE UR-MCNC: NORMAL
GROUP B BETA STREP DNA (PCR): SIGNIFICANT CHANGE UP
GROUP B BETA STREP INTERPRETATION: SIGNIFICANT CHANGE UP
HCG UR QL: 0.2 EU/DL
HGB UR QL STRIP.AUTO: NORMAL
HIV 1+2 AB+HIV1 P24 AG SERPL QL IA: SIGNIFICANT CHANGE UP
KETONES UR-MCNC: NORMAL
LEUKOCYTE ESTERASE UR QL STRIP: NORMAL
NITRITE UR QL STRIP: NORMAL
PH UR STRIP: 6
PROT UR STRIP-MCNC: NORMAL
SOURCE GROUP B STREP: SIGNIFICANT CHANGE UP
SP GR UR STRIP: 1.02
T PALLIDUM AB TITR SER: NEGATIVE — SIGNIFICANT CHANGE UP

## 2019-12-12 ENCOUNTER — OUTPATIENT (OUTPATIENT)
Dept: OUTPATIENT SERVICES | Facility: HOSPITAL | Age: 29
LOS: 1 days | End: 2019-12-12
Payer: COMMERCIAL

## 2019-12-12 DIAGNOSIS — Z3A.00 WEEKS OF GESTATION OF PREGNANCY NOT SPECIFIED: ICD-10-CM

## 2019-12-12 DIAGNOSIS — E66.01 MORBID (SEVERE) OBESITY DUE TO EXCESS CALORIES: ICD-10-CM

## 2019-12-12 DIAGNOSIS — O26.899 OTHER SPECIFIED PREGNANCY RELATED CONDITIONS, UNSPECIFIED TRIMESTER: ICD-10-CM

## 2019-12-12 DIAGNOSIS — O34.219 MATERNAL CARE FOR UNSPECIFIED TYPE SCAR FROM PREVIOUS CESAREAN DELIVERY: ICD-10-CM

## 2019-12-12 DIAGNOSIS — O30.049 TWIN PREGNANCY, DICHORIONIC/DIAMNIOTIC, UNSPECIFIED TRIMESTER: ICD-10-CM

## 2019-12-12 LAB
ANION GAP SERPL CALC-SCNC: 10 MMOL/L — SIGNIFICANT CHANGE UP (ref 5–17)
BLD GP AB SCN SERPL QL: NEGATIVE — SIGNIFICANT CHANGE UP
BUN SERPL-MCNC: 9 MG/DL — SIGNIFICANT CHANGE UP (ref 7–23)
CALCIUM SERPL-MCNC: 9.8 MG/DL — SIGNIFICANT CHANGE UP (ref 8.4–10.5)
CHLORIDE SERPL-SCNC: 107 MMOL/L — SIGNIFICANT CHANGE UP (ref 96–108)
CO2 SERPL-SCNC: 19 MMOL/L — LOW (ref 22–31)
CREAT SERPL-MCNC: 0.83 MG/DL — SIGNIFICANT CHANGE UP (ref 0.5–1.3)
GLUCOSE SERPL-MCNC: 78 MG/DL — SIGNIFICANT CHANGE UP (ref 70–99)
HCT VFR BLD CALC: 35.6 % — SIGNIFICANT CHANGE UP (ref 34.5–45)
HGB BLD-MCNC: 12.1 G/DL — SIGNIFICANT CHANGE UP (ref 11.5–15.5)
MCHC RBC-ENTMCNC: 31 PG — SIGNIFICANT CHANGE UP (ref 27–34)
MCHC RBC-ENTMCNC: 34 GM/DL — SIGNIFICANT CHANGE UP (ref 32–36)
MCV RBC AUTO: 91.3 FL — SIGNIFICANT CHANGE UP (ref 80–100)
PLATELET # BLD AUTO: 252 K/UL — SIGNIFICANT CHANGE UP (ref 150–400)
POTASSIUM SERPL-MCNC: 3.8 MMOL/L — SIGNIFICANT CHANGE UP (ref 3.5–5.3)
POTASSIUM SERPL-SCNC: 3.8 MMOL/L — SIGNIFICANT CHANGE UP (ref 3.5–5.3)
RBC # BLD: 3.9 M/UL — SIGNIFICANT CHANGE UP (ref 3.8–5.2)
RBC # FLD: 13.6 % — SIGNIFICANT CHANGE UP (ref 10.3–14.5)
RH IG SCN BLD-IMP: POSITIVE — SIGNIFICANT CHANGE UP
SODIUM SERPL-SCNC: 136 MMOL/L — SIGNIFICANT CHANGE UP (ref 135–145)
WBC # BLD: 10.3 K/UL — SIGNIFICANT CHANGE UP (ref 3.8–10.5)
WBC # FLD AUTO: 10.3 K/UL — SIGNIFICANT CHANGE UP (ref 3.8–10.5)

## 2019-12-12 PROCEDURE — 86850 RBC ANTIBODY SCREEN: CPT

## 2019-12-12 PROCEDURE — 85027 COMPLETE CBC AUTOMATED: CPT

## 2019-12-12 PROCEDURE — G0463: CPT

## 2019-12-12 PROCEDURE — 86900 BLOOD TYPING SEROLOGIC ABO: CPT

## 2019-12-12 PROCEDURE — 86901 BLOOD TYPING SEROLOGIC RH(D): CPT

## 2019-12-12 PROCEDURE — 80048 BASIC METABOLIC PNL TOTAL CA: CPT

## 2019-12-12 NOTE — CONSULT NOTE ADULT - SUBJECTIVE AND OBJECTIVE BOX
Dear Dr. Juanjose Sow,    I had the pleasure of seeing Ms. Federica Monahan in consultation today.  As you know, she is a GXPX at XX weeks gestation w/ an EDC of 20 and a scheduled  for 19. She presents today with a history of morbid obesity and preeclampsia in previous pregnancy. Federica is s/p primary  in 2018 for preeclampsia and IUGR. She states she tolerated a combined spinal epidural but did feel nauseous after. She denies chronic back pain other than expected back pain associated with pregnancy. Her blood pressure has been well controlled during this pregnancy.                                      Past Medical History: Preeclampsia, HTN, obesity, PCOS, endometriosis, HSV+, well controlled asthma, idiopathic LE lymphedema     Past Surgical History: s/p  , s/p ex lap     Family History of Anesthetic Problems: Mother - nausea    Medications: Aspirin 81mg 1.5 tabs daily, Labetalol 200mg 2x/day, PNV, Iron, Folic Acid, Valacyclovir     Allergies: NKDA     Physical Examination:  Height:   5'7"        Weight:   338 lbs           Airway:  I     Impression: Thank you for the courtesy of this consultation. The patient’s obesity does not contraindicate placement of a neuraxial blockade. However, it makes such an anesthetic technically more difficult. The patient understands this. More over, the increased difficulty may necessitate the need for additional attempts and this may, in fact, increase the chances of back pain or other complications—like spinal headache. Should a general anesthetic be required , the patient also understands that she is at particular risk of difficult airway instrumentation and the complications thereof.    Thank you for the courtesy of this consultation. The final anesthetic plan is as per the on call team. Dear Dr. Juanjose Sow,    I had the pleasure of seeing Ms. Federica Monahan in consultation today.  As you know, she is a  at 35 weeks gestation w/ an EDC of 20 and a scheduled  for 19. She presents today with a history of morbid obesity and HTN. Federica is s/p primary  in 2018 for preeclampsia and IUGR. She states she tolerated a combined spinal epidural but did feel nauseous after. She denies chronic back pain other than expected back pain associated with pregnancy. Her blood pressure has been well controlled during this pregnancy w/ Labetalol 200mg 2x/day.                                   Past Medical History: Preeclampsia 2018, HTN, obesity, PCOS, endometriosis, genital herpes, well controlled asthma, idiopathic LE lymphedema     Past Surgical History: s/p  , s/p ex lap     Family History of Anesthetic Problems: Mother - nausea    Medications: Aspirin 81mg 1.5 tabs daily, Labetalol 200mg 2x/day, PNV, Iron, Folic Acid, Valacyclovir     Allergies: NKDA     Physical Examination:  Height:   5'7"        Weight:   338 lbs           Airway:  I     Impression: Thank you for the courtesy of this consultation. The patient’s obesity does not contraindicate placement of a neuraxial blockade. However, it makes such an anesthetic technically more difficult. The patient understands this. More over, the increased difficulty may necessitate the need for additional attempts and this may, in fact, increase the chances of back pain or other complications—like spinal headache. Should a general anesthetic be required , the patient also understands that she is at particular risk of difficult airway instrumentation and the complications thereof.    Thank you for the courtesy of this consultation. The final anesthetic plan is as per the on call team. Dear Dr. Juanjose Sow,    I had the pleasure of seeing Ms. Federica Monahan in consultation today.  As you know, she is a  at 35 weeks gestation (twin pregnancy) w/ an EDC of 20 and a scheduled  for 19. She presents today with a history of morbid obesity. Federica is s/p primary  in 2018 for preeclampsia and IUGR. She states she tolerated a combined spinal epidural but did feel nauseous after. She denies chronic back pain other than expected back pain associated with pregnancy. Her blood pressure has been well controlled during this pregnancy w/ Labetalol 200mg 2x/day.                                   Past Medical History: Preeclampsia 2018, PCOS, endometriosis, genital herpes, well controlled asthma, idiopathic LE lymphedema     Past Surgical History: s/p  , s/p ex lap     Family History of Anesthetic Problems: Mother - nausea    Medications: Aspirin 81mg 1.5 tabs daily, Labetalol 200mg 2x/day, PNV, Iron, Folic Acid, Valacyclovir 1g 2x/day    Allergies: NKDA     Physical Examination:  Height:   5'7"        Weight:   338 lbs           Airway: I    Impression: Thank you for the courtesy of this consultation. The patient’s obesity does not contraindicate placement of a neuraxial blockade. However, it makes such an anesthetic technically more difficult. The patient understands this. More over, the increased difficulty may necessitate the need for additional attempts and this may, in fact, increase the chances of back pain or other complications—like spinal headache. Should a general anesthetic be required , the patient also understands that she is at particular risk of difficult airway instrumentation and the complications thereof.    Thank you for the courtesy of this consultation. The final anesthetic plan is as per the on call team.

## 2019-12-13 ENCOUNTER — APPOINTMENT (OUTPATIENT)
Dept: ANTEPARTUM | Facility: CLINIC | Age: 29
End: 2019-12-13
Payer: COMMERCIAL

## 2019-12-13 ENCOUNTER — ASOB RESULT (OUTPATIENT)
Age: 29
End: 2019-12-13

## 2019-12-13 ENCOUNTER — APPOINTMENT (OUTPATIENT)
Dept: ANTEPARTUM | Facility: CLINIC | Age: 29
End: 2019-12-13

## 2019-12-13 PROCEDURE — 76818 FETAL BIOPHYS PROFILE W/NST: CPT | Mod: 26,59

## 2019-12-16 DIAGNOSIS — O34.219 MATERNAL CARE FOR UNSPECIFIED TYPE SCAR FROM PREVIOUS CESAREAN DELIVERY: ICD-10-CM

## 2019-12-16 DIAGNOSIS — Z3A.34 34 WEEKS GESTATION OF PREGNANCY: ICD-10-CM

## 2019-12-16 DIAGNOSIS — Z03.74 ENCOUNTER FOR SUSPECTED PROBLEM WITH FETAL GROWTH RULED OUT: ICD-10-CM

## 2019-12-16 DIAGNOSIS — O30.043 TWIN PREGNANCY, DICHORIONIC/DIAMNIOTIC, THIRD TRIMESTER: ICD-10-CM

## 2019-12-17 ENCOUNTER — INPATIENT (INPATIENT)
Facility: HOSPITAL | Age: 29
LOS: 3 days | Discharge: HOME | End: 2019-12-21
Attending: OBSTETRICS & GYNECOLOGY | Admitting: OBSTETRICS & GYNECOLOGY
Payer: COMMERCIAL

## 2019-12-17 ENCOUNTER — ASOB RESULT (OUTPATIENT)
Age: 29
End: 2019-12-17

## 2019-12-17 ENCOUNTER — NON-APPOINTMENT (OUTPATIENT)
Age: 29
End: 2019-12-17

## 2019-12-17 ENCOUNTER — APPOINTMENT (OUTPATIENT)
Dept: ANTEPARTUM | Facility: CLINIC | Age: 29
End: 2019-12-17
Payer: COMMERCIAL

## 2019-12-17 ENCOUNTER — TRANSCRIPTION ENCOUNTER (OUTPATIENT)
Age: 29
End: 2019-12-17

## 2019-12-17 ENCOUNTER — OUTPATIENT (OUTPATIENT)
Dept: OUTPATIENT SERVICES | Facility: HOSPITAL | Age: 29
LOS: 1 days | End: 2019-12-17
Payer: COMMERCIAL

## 2019-12-17 ENCOUNTER — APPOINTMENT (OUTPATIENT)
Dept: MATERNAL FETAL MEDICINE | Facility: CLINIC | Age: 29
End: 2019-12-17
Payer: COMMERCIAL

## 2019-12-17 VITALS — DIASTOLIC BLOOD PRESSURE: 90 MMHG | HEIGHT: 67 IN | SYSTOLIC BLOOD PRESSURE: 130 MMHG

## 2019-12-17 VITALS
DIASTOLIC BLOOD PRESSURE: 88 MMHG | RESPIRATION RATE: 21 BRPM | SYSTOLIC BLOOD PRESSURE: 130 MMHG | HEART RATE: 73 BPM | WEIGHT: 293 LBS | OXYGEN SATURATION: 99 % | HEIGHT: 67 IN

## 2019-12-17 DIAGNOSIS — O26.899 OTHER SPECIFIED PREGNANCY RELATED CONDITIONS, UNSPECIFIED TRIMESTER: ICD-10-CM

## 2019-12-17 DIAGNOSIS — Z34.80 ENCOUNTER FOR SUPERVISION OF OTHER NORMAL PREGNANCY, UNSPECIFIED TRIMESTER: ICD-10-CM

## 2019-12-17 DIAGNOSIS — Z3A.00 WEEKS OF GESTATION OF PREGNANCY NOT SPECIFIED: ICD-10-CM

## 2019-12-17 DIAGNOSIS — O09.899 SUPERVISION OF OTHER HIGH RISK PREGNANCIES, UNSPECIFIED TRIMESTER: ICD-10-CM

## 2019-12-17 LAB
ALBUMIN SERPL ELPH-MCNC: 3.3 G/DL — SIGNIFICANT CHANGE UP (ref 3.3–5)
ALP SERPL-CCNC: 155 U/L — HIGH (ref 40–120)
ALT FLD-CCNC: 18 U/L — SIGNIFICANT CHANGE UP (ref 10–45)
ANION GAP SERPL CALC-SCNC: 12 MMOL/L — SIGNIFICANT CHANGE UP (ref 5–17)
APPEARANCE UR: ABNORMAL
APTT BLD: 25.7 SEC — LOW (ref 27.5–36.3)
AST SERPL-CCNC: 22 U/L — SIGNIFICANT CHANGE UP (ref 10–40)
BASOPHILS # BLD AUTO: 0.05 K/UL — SIGNIFICANT CHANGE UP (ref 0–0.2)
BASOPHILS NFR BLD AUTO: 0.5 % — SIGNIFICANT CHANGE UP (ref 0–2)
BILIRUB SERPL-MCNC: 0.3 MG/DL — SIGNIFICANT CHANGE UP (ref 0.2–1.2)
BILIRUB UR QL STRIP: NORMAL
BILIRUB UR-MCNC: NEGATIVE — SIGNIFICANT CHANGE UP
BLD GP AB SCN SERPL QL: NEGATIVE — SIGNIFICANT CHANGE UP
BUN SERPL-MCNC: 9 MG/DL — SIGNIFICANT CHANGE UP (ref 7–23)
CALCIUM SERPL-MCNC: 9.5 MG/DL — SIGNIFICANT CHANGE UP (ref 8.4–10.5)
CHLORIDE SERPL-SCNC: 105 MMOL/L — SIGNIFICANT CHANGE UP (ref 96–108)
CO2 SERPL-SCNC: 20 MMOL/L — LOW (ref 22–31)
COLOR SPEC: YELLOW — SIGNIFICANT CHANGE UP
CREAT ?TM UR-MCNC: 191 MG/DL — SIGNIFICANT CHANGE UP
CREAT SERPL-MCNC: 0.9 MG/DL — SIGNIFICANT CHANGE UP (ref 0.5–1.3)
DIFF PNL FLD: ABNORMAL
EOSINOPHIL # BLD AUTO: 0.47 K/UL — SIGNIFICANT CHANGE UP (ref 0–0.5)
EOSINOPHIL NFR BLD AUTO: 4.3 % — SIGNIFICANT CHANGE UP (ref 0–6)
FIBRINOGEN PPP-MCNC: 617 MG/DL — HIGH (ref 350–510)
GLUCOSE SERPL-MCNC: 67 MG/DL — LOW (ref 70–99)
GLUCOSE UR QL: NEGATIVE — SIGNIFICANT CHANGE UP
GLUCOSE UR-MCNC: NORMAL
HCG UR QL: 0.2 EU/DL
HCT VFR BLD CALC: 37.2 % — SIGNIFICANT CHANGE UP (ref 34.5–45)
HGB BLD-MCNC: 12.3 G/DL — SIGNIFICANT CHANGE UP (ref 11.5–15.5)
HGB UR QL STRIP.AUTO: NORMAL
IMM GRANULOCYTES NFR BLD AUTO: 0.5 % — SIGNIFICANT CHANGE UP (ref 0–1.5)
INR BLD: 0.98 RATIO — SIGNIFICANT CHANGE UP (ref 0.88–1.16)
KETONES UR-MCNC: ABNORMAL
KETONES UR-MCNC: NORMAL
LDH SERPL L TO P-CCNC: 244 U/L — HIGH (ref 50–242)
LEUKOCYTE ESTERASE UR QL STRIP: NORMAL
LEUKOCYTE ESTERASE UR-ACNC: NEGATIVE — SIGNIFICANT CHANGE UP
LYMPHOCYTES # BLD AUTO: 2.59 K/UL — SIGNIFICANT CHANGE UP (ref 1–3.3)
LYMPHOCYTES # BLD AUTO: 23.6 % — SIGNIFICANT CHANGE UP (ref 13–44)
MCHC RBC-ENTMCNC: 30 PG — SIGNIFICANT CHANGE UP (ref 27–34)
MCHC RBC-ENTMCNC: 33.1 GM/DL — SIGNIFICANT CHANGE UP (ref 32–36)
MCV RBC AUTO: 90.7 FL — SIGNIFICANT CHANGE UP (ref 80–100)
MONOCYTES # BLD AUTO: 0.6 K/UL — SIGNIFICANT CHANGE UP (ref 0–0.9)
MONOCYTES NFR BLD AUTO: 5.5 % — SIGNIFICANT CHANGE UP (ref 2–14)
NEUTROPHILS # BLD AUTO: 7.22 K/UL — SIGNIFICANT CHANGE UP (ref 1.8–7.4)
NEUTROPHILS NFR BLD AUTO: 65.6 % — SIGNIFICANT CHANGE UP (ref 43–77)
NITRITE UR QL STRIP: NORMAL
NITRITE UR-MCNC: NEGATIVE — SIGNIFICANT CHANGE UP
NRBC # BLD: 0 /100 WBCS — SIGNIFICANT CHANGE UP (ref 0–0)
PH UR STRIP: 5.5
PH UR: 6.5 — SIGNIFICANT CHANGE UP (ref 5–8)
PLATELET # BLD AUTO: 237 K/UL — SIGNIFICANT CHANGE UP (ref 150–400)
POTASSIUM SERPL-MCNC: 4.3 MMOL/L — SIGNIFICANT CHANGE UP (ref 3.5–5.3)
POTASSIUM SERPL-SCNC: 4.3 MMOL/L — SIGNIFICANT CHANGE UP (ref 3.5–5.3)
PROT ?TM UR-MCNC: 28 MG/DL — HIGH (ref 0–12)
PROT SERPL-MCNC: 6.4 G/DL — SIGNIFICANT CHANGE UP (ref 6–8.3)
PROT UR STRIP-MCNC: NORMAL
PROT UR-MCNC: ABNORMAL
PROT/CREAT UR-RTO: 0.1 RATIO — SIGNIFICANT CHANGE UP (ref 0–0.2)
PROTHROM AB SERPL-ACNC: 11.2 SEC — SIGNIFICANT CHANGE UP (ref 10–12.9)
RBC # BLD: 4.1 M/UL — SIGNIFICANT CHANGE UP (ref 3.8–5.2)
RBC # FLD: 13.4 % — SIGNIFICANT CHANGE UP (ref 10.3–14.5)
RH IG SCN BLD-IMP: POSITIVE — SIGNIFICANT CHANGE UP
SODIUM SERPL-SCNC: 137 MMOL/L — SIGNIFICANT CHANGE UP (ref 135–145)
SP GR SPEC: 1.02 — SIGNIFICANT CHANGE UP (ref 1.01–1.02)
SP GR UR STRIP: 1.02
URATE SERPL-MCNC: 6.6 MG/DL — SIGNIFICANT CHANGE UP (ref 2.5–7)
UROBILINOGEN FLD QL: NEGATIVE — SIGNIFICANT CHANGE UP
WBC # BLD: 10.98 K/UL — HIGH (ref 3.8–10.5)
WBC # FLD AUTO: 10.98 K/UL — HIGH (ref 3.8–10.5)

## 2019-12-17 PROCEDURE — 76818 FETAL BIOPHYS PROFILE W/NST: CPT | Mod: 26

## 2019-12-17 PROCEDURE — 81003 URINALYSIS AUTO W/O SCOPE: CPT

## 2019-12-17 PROCEDURE — 99213 OFFICE O/P EST LOW 20 MIN: CPT | Mod: GE,25

## 2019-12-17 PROCEDURE — 76818 FETAL BIOPHYS PROFILE W/NST: CPT

## 2019-12-17 PROCEDURE — 76820 UMBILICAL ARTERY ECHO: CPT | Mod: 59

## 2019-12-17 PROCEDURE — G0463: CPT

## 2019-12-17 PROCEDURE — 76820 UMBILICAL ARTERY ECHO: CPT | Mod: 26

## 2019-12-17 PROCEDURE — 76816 OB US FOLLOW-UP PER FETUS: CPT | Mod: 59

## 2019-12-17 PROCEDURE — 76816 OB US FOLLOW-UP PER FETUS: CPT | Mod: 26,59

## 2019-12-17 PROCEDURE — 76816 OB US FOLLOW-UP PER FETUS: CPT | Mod: 26

## 2019-12-17 PROCEDURE — 76818 FETAL BIOPHYS PROFILE W/NST: CPT | Mod: 26,59

## 2019-12-17 RX ORDER — OXYTOCIN 10 UNIT/ML
333.33 VIAL (ML) INJECTION
Qty: 20 | Refills: 0 | Status: COMPLETED | OUTPATIENT
Start: 2019-12-17 | End: 2019-12-17

## 2019-12-17 RX ORDER — SODIUM CHLORIDE 9 MG/ML
1000 INJECTION, SOLUTION INTRAVENOUS
Refills: 0 | Status: DISCONTINUED | OUTPATIENT
Start: 2019-12-17 | End: 2019-12-18

## 2019-12-17 RX ORDER — CITRIC ACID/SODIUM CITRATE 300-500 MG
15 SOLUTION, ORAL ORAL EVERY 6 HOURS
Refills: 0 | Status: DISCONTINUED | OUTPATIENT
Start: 2019-12-17 | End: 2019-12-18

## 2019-12-17 RX ADMIN — FAMOTIDINE 20 MILLIGRAM(S): 10 INJECTION INTRAVENOUS at 23:44

## 2019-12-17 RX ADMIN — Medication 15 MILLILITER(S): at 23:44

## 2019-12-17 NOTE — PATIENT PROFILE OB - PAIN SCALE PREFERRED, PROFILE
Anesthetic History No history of anesthetic complications Review of Systems / Medical History Patient summary reviewed and pertinent labs reviewed Pulmonary Within defined limits Neuro/Psych Psychiatric history Cardiovascular Hypertension Exercise tolerance: >4 METS 
  
GI/Hepatic/Renal 
Within defined limits Endo/Other Arthritis and cancer (breast) Other Findings Physical Exam 
 
Airway Mallampati: II 
TM Distance: 4 - 6 cm Neck ROM: normal range of motion Mouth opening: Normal 
 
 Cardiovascular Regular rate and rhythm,  S1 and S2 normal,  no murmur, click, rub, or gallop Rhythm: regular Rate: normal 
 
 
Pertinent negatives: No murmur Dental 
 
Dentition: Implants Pulmonary Breath sounds clear to auscultation Abdominal 
GI exam deferred Other Findings Anesthetic Plan ASA: 2 Anesthesia type: general 
 
 
 
 
Induction: Intravenous Anesthetic plan and risks discussed with: Patient
none

## 2019-12-18 LAB
ALBUMIN SERPL ELPH-MCNC: 2.7 G/DL — LOW (ref 3.3–5)
ALP SERPL-CCNC: 133 U/L — HIGH (ref 40–120)
ALT FLD-CCNC: 17 U/L — SIGNIFICANT CHANGE UP (ref 10–45)
ANION GAP SERPL CALC-SCNC: 12 MMOL/L — SIGNIFICANT CHANGE UP (ref 5–17)
APTT BLD: 26.9 SEC — LOW (ref 27.5–36.3)
AST SERPL-CCNC: 21 U/L — SIGNIFICANT CHANGE UP (ref 10–40)
BASOPHILS # BLD AUTO: 0.03 K/UL — SIGNIFICANT CHANGE UP (ref 0–0.2)
BASOPHILS NFR BLD AUTO: 0.2 % — SIGNIFICANT CHANGE UP (ref 0–2)
BILIRUB SERPL-MCNC: 0.3 MG/DL — SIGNIFICANT CHANGE UP (ref 0.2–1.2)
BUN SERPL-MCNC: 10 MG/DL — SIGNIFICANT CHANGE UP (ref 7–23)
CALCIUM SERPL-MCNC: 8.9 MG/DL — SIGNIFICANT CHANGE UP (ref 8.4–10.5)
CHLORIDE SERPL-SCNC: 105 MMOL/L — SIGNIFICANT CHANGE UP (ref 96–108)
CO2 SERPL-SCNC: 19 MMOL/L — LOW (ref 22–31)
CREAT SERPL-MCNC: 0.81 MG/DL — SIGNIFICANT CHANGE UP (ref 0.5–1.3)
EOSINOPHIL # BLD AUTO: 0.01 K/UL — SIGNIFICANT CHANGE UP (ref 0–0.5)
EOSINOPHIL NFR BLD AUTO: 0.1 % — SIGNIFICANT CHANGE UP (ref 0–6)
FIBRINOGEN PPP-MCNC: 512 MG/DL — HIGH (ref 350–510)
GLUCOSE SERPL-MCNC: 98 MG/DL — SIGNIFICANT CHANGE UP (ref 70–99)
HCT VFR BLD CALC: 33.5 % — LOW (ref 34.5–45)
HGB BLD-MCNC: 11.4 G/DL — LOW (ref 11.5–15.5)
IMM GRANULOCYTES NFR BLD AUTO: 0.4 % — SIGNIFICANT CHANGE UP (ref 0–1.5)
INR BLD: 0.98 RATIO — SIGNIFICANT CHANGE UP (ref 0.88–1.16)
LDH SERPL L TO P-CCNC: 278 U/L — HIGH (ref 50–242)
LYMPHOCYTES # BLD AUTO: 1.24 K/UL — SIGNIFICANT CHANGE UP (ref 1–3.3)
LYMPHOCYTES # BLD AUTO: 7.9 % — LOW (ref 13–44)
MCHC RBC-ENTMCNC: 30.6 PG — SIGNIFICANT CHANGE UP (ref 27–34)
MCHC RBC-ENTMCNC: 34 GM/DL — SIGNIFICANT CHANGE UP (ref 32–36)
MCV RBC AUTO: 90.1 FL — SIGNIFICANT CHANGE UP (ref 80–100)
MONOCYTES # BLD AUTO: 0.25 K/UL — SIGNIFICANT CHANGE UP (ref 0–0.9)
MONOCYTES NFR BLD AUTO: 1.6 % — LOW (ref 2–14)
NEUTROPHILS # BLD AUTO: 14.15 K/UL — HIGH (ref 1.8–7.4)
NEUTROPHILS NFR BLD AUTO: 89.8 % — HIGH (ref 43–77)
NRBC # BLD: 0 /100 WBCS — SIGNIFICANT CHANGE UP (ref 0–0)
PLATELET # BLD AUTO: 218 K/UL — SIGNIFICANT CHANGE UP (ref 150–400)
POTASSIUM SERPL-MCNC: 4.5 MMOL/L — SIGNIFICANT CHANGE UP (ref 3.5–5.3)
POTASSIUM SERPL-SCNC: 4.5 MMOL/L — SIGNIFICANT CHANGE UP (ref 3.5–5.3)
PROT SERPL-MCNC: 5.7 G/DL — LOW (ref 6–8.3)
PROTHROM AB SERPL-ACNC: 11.2 SEC — SIGNIFICANT CHANGE UP (ref 10–12.9)
RBC # BLD: 3.72 M/UL — LOW (ref 3.8–5.2)
RBC # FLD: 13.2 % — SIGNIFICANT CHANGE UP (ref 10.3–14.5)
SODIUM SERPL-SCNC: 136 MMOL/L — SIGNIFICANT CHANGE UP (ref 135–145)
T PALLIDUM AB TITR SER: NEGATIVE — SIGNIFICANT CHANGE UP
URATE SERPL-MCNC: 6.5 MG/DL — SIGNIFICANT CHANGE UP (ref 2.5–7)
WBC # BLD: 15.75 K/UL — HIGH (ref 3.8–10.5)
WBC # FLD AUTO: 15.75 K/UL — HIGH (ref 3.8–10.5)

## 2019-12-18 PROCEDURE — 88307 TISSUE EXAM BY PATHOLOGIST: CPT | Mod: 26

## 2019-12-18 PROCEDURE — 59514 CESAREAN DELIVERY ONLY: CPT | Mod: U7

## 2019-12-18 RX ORDER — DEXAMETHASONE 0.5 MG/5ML
4 ELIXIR ORAL EVERY 6 HOURS
Refills: 0 | Status: DISCONTINUED | OUTPATIENT
Start: 2019-12-18 | End: 2019-12-19

## 2019-12-18 RX ORDER — LANOLIN
1 OINTMENT (GRAM) TOPICAL EVERY 6 HOURS
Refills: 0 | Status: DISCONTINUED | OUTPATIENT
Start: 2019-12-18 | End: 2019-12-21

## 2019-12-18 RX ORDER — SODIUM CHLORIDE 9 MG/ML
500 INJECTION, SOLUTION INTRAVENOUS ONCE
Refills: 0 | Status: COMPLETED | OUTPATIENT
Start: 2019-12-18 | End: 2019-12-18

## 2019-12-18 RX ORDER — FOLIC ACID 0.8 MG
1 TABLET ORAL DAILY
Refills: 0 | Status: DISCONTINUED | OUTPATIENT
Start: 2019-12-18 | End: 2019-12-21

## 2019-12-18 RX ORDER — SODIUM CHLORIDE 0.65 %
1 AEROSOL, SPRAY (ML) NASAL
Refills: 0 | Status: DISCONTINUED | OUTPATIENT
Start: 2019-12-18 | End: 2019-12-21

## 2019-12-18 RX ORDER — KETOROLAC TROMETHAMINE 30 MG/ML
30 SYRINGE (ML) INJECTION EVERY 6 HOURS
Refills: 0 | Status: DISCONTINUED | OUTPATIENT
Start: 2019-12-18 | End: 2019-12-20

## 2019-12-18 RX ORDER — ONDANSETRON 8 MG/1
4 TABLET, FILM COATED ORAL EVERY 6 HOURS
Refills: 0 | Status: DISCONTINUED | OUTPATIENT
Start: 2019-12-18 | End: 2019-12-19

## 2019-12-18 RX ORDER — GLYCERIN ADULT
1 SUPPOSITORY, RECTAL RECTAL AT BEDTIME
Refills: 0 | Status: DISCONTINUED | OUTPATIENT
Start: 2019-12-18 | End: 2019-12-21

## 2019-12-18 RX ORDER — HEPARIN SODIUM 5000 [USP'U]/ML
10000 INJECTION INTRAVENOUS; SUBCUTANEOUS EVERY 12 HOURS
Refills: 0 | Status: DISCONTINUED | OUTPATIENT
Start: 2019-12-18 | End: 2019-12-21

## 2019-12-18 RX ORDER — OXYCODONE HYDROCHLORIDE 5 MG/1
5 TABLET ORAL
Refills: 0 | Status: COMPLETED | OUTPATIENT
Start: 2019-12-18 | End: 2019-12-25

## 2019-12-18 RX ORDER — FERROUS SULFATE 325(65) MG
325 TABLET ORAL DAILY
Refills: 0 | Status: DISCONTINUED | OUTPATIENT
Start: 2019-12-18 | End: 2019-12-21

## 2019-12-18 RX ORDER — OXYCODONE HYDROCHLORIDE 5 MG/1
5 TABLET ORAL ONCE
Refills: 0 | Status: DISCONTINUED | OUTPATIENT
Start: 2019-12-18 | End: 2019-12-21

## 2019-12-18 RX ORDER — ACETAMINOPHEN 500 MG
975 TABLET ORAL
Refills: 0 | Status: DISCONTINUED | OUTPATIENT
Start: 2019-12-18 | End: 2019-12-21

## 2019-12-18 RX ORDER — DIPHENHYDRAMINE HCL 50 MG
25 CAPSULE ORAL EVERY 6 HOURS
Refills: 0 | Status: DISCONTINUED | OUTPATIENT
Start: 2019-12-18 | End: 2019-12-21

## 2019-12-18 RX ORDER — FENTANYL/BUPIVACAINE/NS/PF 2MCG/ML-.1
5 PLASTIC BAG, INJECTION (ML) INJECTION
Refills: 0 | Status: DISCONTINUED | OUTPATIENT
Start: 2019-12-18 | End: 2019-12-19

## 2019-12-18 RX ORDER — LABETALOL HCL 100 MG
200 TABLET ORAL EVERY 12 HOURS
Refills: 0 | Status: DISCONTINUED | OUTPATIENT
Start: 2019-12-18 | End: 2019-12-21

## 2019-12-18 RX ORDER — FAMOTIDINE 10 MG/ML
20 INJECTION INTRAVENOUS ONCE
Refills: 0 | Status: COMPLETED | OUTPATIENT
Start: 2019-12-18 | End: 2019-12-17

## 2019-12-18 RX ORDER — MAGNESIUM HYDROXIDE 400 MG/1
30 TABLET, CHEWABLE ORAL
Refills: 0 | Status: DISCONTINUED | OUTPATIENT
Start: 2019-12-18 | End: 2019-12-21

## 2019-12-18 RX ORDER — SODIUM CHLORIDE 9 MG/ML
500 INJECTION, SOLUTION INTRAVENOUS
Refills: 0 | Status: DISCONTINUED | OUTPATIENT
Start: 2019-12-18 | End: 2019-12-19

## 2019-12-18 RX ORDER — OXYTOCIN 10 UNIT/ML
333.33 VIAL (ML) INJECTION
Qty: 20 | Refills: 0 | Status: DISCONTINUED | OUTPATIENT
Start: 2019-12-18 | End: 2019-12-21

## 2019-12-18 RX ORDER — SIMETHICONE 80 MG/1
80 TABLET, CHEWABLE ORAL EVERY 4 HOURS
Refills: 0 | Status: DISCONTINUED | OUTPATIENT
Start: 2019-12-18 | End: 2019-12-21

## 2019-12-18 RX ORDER — NALOXONE HYDROCHLORIDE 4 MG/.1ML
0.1 SPRAY NASAL
Refills: 0 | Status: DISCONTINUED | OUTPATIENT
Start: 2019-12-18 | End: 2019-12-19

## 2019-12-18 RX ORDER — IBUPROFEN 200 MG
600 TABLET ORAL EVERY 6 HOURS
Refills: 0 | Status: COMPLETED | OUTPATIENT
Start: 2019-12-18 | End: 2020-11-15

## 2019-12-18 RX ORDER — TETANUS TOXOID, REDUCED DIPHTHERIA TOXOID AND ACELLULAR PERTUSSIS VACCINE, ADSORBED 5; 2.5; 8; 8; 2.5 [IU]/.5ML; [IU]/.5ML; UG/.5ML; UG/.5ML; UG/.5ML
0.5 SUSPENSION INTRAMUSCULAR ONCE
Refills: 0 | Status: DISCONTINUED | OUTPATIENT
Start: 2019-12-18 | End: 2019-12-21

## 2019-12-18 RX ORDER — FENTANYL/BUPIVACAINE/NS/PF 2MCG/ML-.1
250 PLASTIC BAG, INJECTION (ML) INJECTION
Refills: 0 | Status: DISCONTINUED | OUTPATIENT
Start: 2019-12-18 | End: 2019-12-19

## 2019-12-18 RX ADMIN — SODIUM CHLORIDE 1000 MILLILITER(S): 9 INJECTION, SOLUTION INTRAVENOUS at 17:24

## 2019-12-18 RX ADMIN — HEPARIN SODIUM 10000 UNIT(S): 5000 INJECTION INTRAVENOUS; SUBCUTANEOUS at 08:40

## 2019-12-18 RX ADMIN — Medication 975 MILLIGRAM(S): at 13:49

## 2019-12-18 RX ADMIN — Medication 30 MILLIGRAM(S): at 10:02

## 2019-12-18 RX ADMIN — Medication 200 MILLIGRAM(S): at 08:40

## 2019-12-18 RX ADMIN — Medication 30 MILLIGRAM(S): at 15:25

## 2019-12-18 RX ADMIN — Medication 975 MILLIGRAM(S): at 21:17

## 2019-12-18 RX ADMIN — Medication 975 MILLIGRAM(S): at 02:49

## 2019-12-18 RX ADMIN — Medication 30 MILLIGRAM(S): at 02:48

## 2019-12-18 RX ADMIN — Medication 30 MILLIGRAM(S): at 15:49

## 2019-12-18 RX ADMIN — Medication 30 MILLIGRAM(S): at 10:30

## 2019-12-18 RX ADMIN — Medication 325 MILLIGRAM(S): at 13:16

## 2019-12-18 RX ADMIN — Medication 1 TABLET(S): at 13:16

## 2019-12-18 RX ADMIN — Medication 30 MILLIGRAM(S): at 23:32

## 2019-12-18 RX ADMIN — Medication 975 MILLIGRAM(S): at 13:17

## 2019-12-18 RX ADMIN — Medication 975 MILLIGRAM(S): at 20:17

## 2019-12-18 RX ADMIN — Medication 1000 MILLIUNIT(S)/MIN: at 00:44

## 2019-12-18 RX ADMIN — Medication 1 MILLIGRAM(S): at 13:16

## 2019-12-18 RX ADMIN — Medication 250 MILLILITER(S): at 04:41

## 2019-12-18 RX ADMIN — Medication 250 MILLILITER(S): at 02:26

## 2019-12-18 RX ADMIN — HEPARIN SODIUM 10000 UNIT(S): 5000 INJECTION INTRAVENOUS; SUBCUTANEOUS at 20:17

## 2019-12-18 NOTE — PROVIDER CONTACT NOTE (OTHER) - BACKGROUND
cHTN on labetalol 200 bid, left leg lymph edema ,  2018(IUGR & PEC on magnesium) sinus sx, r wrist sx, laproscopic for PCOS

## 2019-12-18 NOTE — PROVIDER CONTACT NOTE (OTHER) - ACTION/TREATMENT ORDERED:
Dr CheekJordin in to examine pt. Changes made to IV fluids and blood work ordered for the morning. Continue to observe pt.

## 2019-12-18 NOTE — PROVIDER CONTACT NOTE (OTHER) - BACKGROUND
Pt had C/S delivery today at 0043. Elizabeth d/c'd in delivery room. Pt unable to void after attempts throughout day.

## 2019-12-18 NOTE — CHART NOTE - NSCHARTNOTEFT_GEN_A_CORE
R1 Event Note    Evaluated patient at bedside for decreased urine output.     S: Patient feels well after her . She has been tolerating PO and ambulating. She endorses significant lower extremity edema during this pregnancy which has not improved after delivery. Denies flatus. She denies vision changes, HA, dizziness, chest pain, fevers, chills, RUQ pain.    Vital Signs Last 24 Hrs  T(C): 36.4 (18 Dec 2019 17:17), Max: 36.9 (18 Dec 2019 09:57)  T(F): 97.5 (18 Dec 2019 17:17), Max: 98.4 (18 Dec 2019 09:57)  HR: 67 (18 Dec 2019 17:17) (46 - 73)  BP: 115/75 (18 Dec 2019 17:17) (115/75 - 145/90)  BP(mean): 108 (18 Dec 2019 04:05) (93 - 110)  RR: 18 (18 Dec 2019 17:17) (7 - 21)  SpO2: 98% (18 Dec 2019 17:17) (95% - 100%)    PE:  - Gen: NAD with family in room  - CV: RRR, normal S1 and S2  - Lungs: CTA b/l  - Abd: soft, nontender  - Ext: severe +3 LE edema bilaterally, L>R, no calf erythema or tenderness  - : pugh in place draining clear urine               11.4   15.75 )-----------( 218      ( 18 Dec 2019 07:13 )             33.5     12-    136  |  105  |  10  ----------------------------<  98  4.5   |  19<L>  |  0.81    Ca    8.9      18 Dec 2019 07:13    TPro  5.7<L>  /  Alb  2.7<L>  /  TBili  0.3  /  DBili  x   /  AST  21  /  ALT  17  /  AlkPhos  133<H>        UOP: 60cc/2 hr = 30cc/hr (adequate = 75cc/hr)      A/P: 30yo  POD0 s/p rLTCS for TIUP at 36wk with oliguria and severe LE edema in the postpartum period. Patient's oliguria is most likely due to third-spacing of fluid after delivery as she has evidence of fluid overload on exam. Her creatinine has been stable and pugh is draining clear yellow urine. Low concern for renal injury at this time. Blood pressures have remained well-controlled with no evidence of pre-eclampsia with severe features.  - No further IVF boluses  - Decrease IV fluids to 50cc/hr  - Encourage PO hydration  - Strongly encouraged ambulation  - SCDs to remain on while patient in bed  - Repeat HELLP labs in the morning  - D/c pugh in the AM  - LE dopplers before discharge    Patient seen and evaluated with Ny Martinez, PGY-4  D/w Dr. Ghanshyam Rubio, PGY-1

## 2019-12-18 NOTE — PROGRESS NOTE ADULT - SUBJECTIVE AND OBJECTIVE BOX
Day 0 of Anesthesia Pain Management Service    SUBJECTIVE: I'm okay  Pain Scale Score:    [X] Refer to charted pain scores    THERAPY: Epidural Bupivacaine 0.01 % and Fentanyl 3 micrograms/mL     Demand Dose: 3 mL  Lockout: 15 minutes   Continuous Rate:  10 mL    MEDICATIONS  (STANDING):  acetaminophen   Tablet .. 975 milliGRAM(s) Oral <User Schedule>  diphtheria/tetanus/pertussis (acellular) Vaccine (ADAcel) 0.5 milliLiter(s) IntraMuscular once  fentanyl (3 MICROgram(s)/mL) + bupivacaine 0.01% in 0.9% Sodium Chloride PCEA 250 milliLiter(s) Epidural PCA Continuous  ferrous    sulfate 325 milliGRAM(s) Oral daily  folic acid 1 milliGRAM(s) Oral daily  heparin  Injectable 37994 Unit(s) SubCutaneous every 12 hours  ibuprofen  Tablet. 600 milliGRAM(s) Oral every 6 hours  ketorolac   Injectable 30 milliGRAM(s) IV Push every 6 hours  labetalol 200 milliGRAM(s) Oral every 12 hours  oxytocin Infusion 333.333 milliUNIT(s)/Min (1000 mL/Hr) IV Continuous <Continuous>  prenatal multivitamin 1 Tablet(s) Oral daily    MEDICATIONS  (PRN):  dexAMETHasone  Injectable 4 milliGRAM(s) IV Push every 6 hours PRN Nausea  diphenhydrAMINE 25 milliGRAM(s) Oral every 6 hours PRN Itching  fentanyl (3 MICROgram(s)/mL) + bupivacaine 0.01% in 0.9% Sodium Chloride PCEA Rescue Clinician Bolus 5 milliLiter(s) Epidural every 15 minutes PRN Moderate Pain (4 - 6)  glycerin Suppository - Adult 1 Suppository(s) Rectal at bedtime PRN Constipation  lanolin Ointment 1 Application(s) Topical every 6 hours PRN Sore Nipples  magnesium hydroxide Suspension 30 milliLiter(s) Oral two times a day PRN Constipation  naloxone Injectable 0.1 milliGRAM(s) IV Push every 3 minutes PRN For ANY of the following changes in patient status:  A. RR LESS THAN 10 breaths per minute, B. Oxygen saturation LESS THAN 90%, C. Sedation score of 6  ondansetron Injectable 4 milliGRAM(s) IV Push every 6 hours PRN Nausea  oxyCODONE    IR 5 milliGRAM(s) Oral every 3 hours PRN Moderate to Severe Pain (4-10)  oxyCODONE    IR 5 milliGRAM(s) Oral once PRN Moderate to Severe Pain (4-10)  simethicone 80 milliGRAM(s) Chew every 4 hours PRN Gas  sodium chloride 0.65% Nasal 1 Spray(s) Both Nostrils four times a day PRN Nasal Congestion      OBJECTIVE:    Assessment of Epidural Catheter Site: 	    [X] Dressing intact	[X] Site non-tender	[X] Site without erythema, discharge, edema  [X] Epidural tubing and connection checked	[X] Gross neurological exam within normal limits  [ ] Catheter removed – tip intact		    PT/INR - ( 18 Dec 2019 07:13 )   PT: 11.2 sec;   INR: 0.98 ratio         PTT - ( 18 Dec 2019 07:13 )  PTT:26.9 sec                      11.4   15.75 )-----------( 218      ( 18 Dec 2019 07:13 )             33.5     Vital Signs Last 24 Hrs  T(C): 36.6 (12-18-19 @ 06:10), Max: 36.6 (12-18-19 @ 06:10)  T(F): 97.8 (12-18-19 @ 06:10), Max: 97.8 (12-18-19 @ 06:10)  HR: 59 (12-18-19 @ 06:10) (46 - 73)  BP: 134/82 (12-18-19 @ 06:10) (127/67 - 145/90)  BP(mean): 108 (12-18-19 @ 04:05) (93 - 110)  RR: 18 (12-18-19 @ 06:10) (7 - 21)  SpO2: 99% (12-18-19 @ 06:10) (98% - 100%)      Sedation Score:	[X] Alert 	[ ] Drowsy	[ ] Arousable  [ ] Asleep  [ ] Unresponsive    Side Effects:	[  ] None	[ ] Nausea	[ ] Vomiting  [X ] Pruritus  		[ ] Weakness  [ ] Numbness  [ ] Other:    ASSESSMENT/ PLAN:    Therapy:                         [X] Continue   [ ] Discontinue   [ ] Change to PRN Analgesics    Documentation and Verification of current medications:  [X] Done	[ ] Not done, not eligible, reason:    Comments: Pain controlled with PCEA. Anticoagulation status noted.

## 2019-12-19 LAB
ALBUMIN SERPL ELPH-MCNC: 2.5 G/DL — LOW (ref 3.3–5)
ALP SERPL-CCNC: 103 U/L — SIGNIFICANT CHANGE UP (ref 40–120)
ALT FLD-CCNC: 14 U/L — SIGNIFICANT CHANGE UP (ref 10–45)
ANION GAP SERPL CALC-SCNC: 12 MMOL/L — SIGNIFICANT CHANGE UP (ref 5–17)
APTT BLD: 27.3 SEC — LOW (ref 27.5–36.3)
AST SERPL-CCNC: 19 U/L — SIGNIFICANT CHANGE UP (ref 10–40)
BASOPHILS # BLD AUTO: 0.03 K/UL — SIGNIFICANT CHANGE UP (ref 0–0.2)
BASOPHILS NFR BLD AUTO: 0.2 % — SIGNIFICANT CHANGE UP (ref 0–2)
BILIRUB SERPL-MCNC: 0.1 MG/DL — LOW (ref 0.2–1.2)
BUN SERPL-MCNC: 15 MG/DL — SIGNIFICANT CHANGE UP (ref 7–23)
CALCIUM SERPL-MCNC: 8.8 MG/DL — SIGNIFICANT CHANGE UP (ref 8.4–10.5)
CHLORIDE SERPL-SCNC: 103 MMOL/L — SIGNIFICANT CHANGE UP (ref 96–108)
CO2 SERPL-SCNC: 21 MMOL/L — LOW (ref 22–31)
CREAT SERPL-MCNC: 0.95 MG/DL — SIGNIFICANT CHANGE UP (ref 0.5–1.3)
EOSINOPHIL # BLD AUTO: 0.03 K/UL — SIGNIFICANT CHANGE UP (ref 0–0.5)
EOSINOPHIL NFR BLD AUTO: 0.2 % — SIGNIFICANT CHANGE UP (ref 0–6)
FIBRINOGEN PPP-MCNC: 432 MG/DL — SIGNIFICANT CHANGE UP (ref 350–510)
GLUCOSE SERPL-MCNC: 101 MG/DL — HIGH (ref 70–99)
HCT VFR BLD CALC: 24.8 % — LOW (ref 34.5–45)
HGB BLD-MCNC: 8.3 G/DL — LOW (ref 11.5–15.5)
IMM GRANULOCYTES NFR BLD AUTO: 0.6 % — SIGNIFICANT CHANGE UP (ref 0–1.5)
INR BLD: 0.94 RATIO — SIGNIFICANT CHANGE UP (ref 0.88–1.16)
LDH SERPL L TO P-CCNC: 219 U/L — SIGNIFICANT CHANGE UP (ref 50–242)
LYMPHOCYTES # BLD AUTO: 19.9 % — SIGNIFICANT CHANGE UP (ref 13–44)
LYMPHOCYTES # BLD AUTO: 3.18 K/UL — SIGNIFICANT CHANGE UP (ref 1–3.3)
MCHC RBC-ENTMCNC: 30.7 PG — SIGNIFICANT CHANGE UP (ref 27–34)
MCHC RBC-ENTMCNC: 33.5 GM/DL — SIGNIFICANT CHANGE UP (ref 32–36)
MCV RBC AUTO: 91.9 FL — SIGNIFICANT CHANGE UP (ref 80–100)
MONOCYTES # BLD AUTO: 1.06 K/UL — HIGH (ref 0–0.9)
MONOCYTES NFR BLD AUTO: 6.6 % — SIGNIFICANT CHANGE UP (ref 2–14)
NEUTROPHILS # BLD AUTO: 11.55 K/UL — HIGH (ref 1.8–7.4)
NEUTROPHILS NFR BLD AUTO: 72.5 % — SIGNIFICANT CHANGE UP (ref 43–77)
NRBC # BLD: 0 /100 WBCS — SIGNIFICANT CHANGE UP (ref 0–0)
PLATELET # BLD AUTO: 182 K/UL — SIGNIFICANT CHANGE UP (ref 150–400)
POTASSIUM SERPL-MCNC: 4.2 MMOL/L — SIGNIFICANT CHANGE UP (ref 3.5–5.3)
POTASSIUM SERPL-SCNC: 4.2 MMOL/L — SIGNIFICANT CHANGE UP (ref 3.5–5.3)
PROT SERPL-MCNC: 4.7 G/DL — LOW (ref 6–8.3)
PROTHROM AB SERPL-ACNC: 10.8 SEC — SIGNIFICANT CHANGE UP (ref 10–12.9)
RBC # BLD: 2.7 M/UL — LOW (ref 3.8–5.2)
RBC # FLD: 13.8 % — SIGNIFICANT CHANGE UP (ref 10.3–14.5)
SODIUM SERPL-SCNC: 136 MMOL/L — SIGNIFICANT CHANGE UP (ref 135–145)
URATE SERPL-MCNC: 6.7 MG/DL — SIGNIFICANT CHANGE UP (ref 2.5–7)
WBC # BLD: 15.94 K/UL — HIGH (ref 3.8–10.5)
WBC # FLD AUTO: 15.94 K/UL — HIGH (ref 3.8–10.5)

## 2019-12-19 PROCEDURE — 93970 EXTREMITY STUDY: CPT | Mod: 26

## 2019-12-19 RX ORDER — ASCORBIC ACID 60 MG
500 TABLET,CHEWABLE ORAL DAILY
Refills: 0 | Status: DISCONTINUED | OUTPATIENT
Start: 2019-12-19 | End: 2019-12-21

## 2019-12-19 RX ORDER — FERROUS SULFATE 325(65) MG
325 TABLET ORAL
Refills: 0 | Status: DISCONTINUED | OUTPATIENT
Start: 2019-12-19 | End: 2019-12-21

## 2019-12-19 RX ORDER — SENNA PLUS 8.6 MG/1
1 TABLET ORAL DAILY
Refills: 0 | Status: DISCONTINUED | OUTPATIENT
Start: 2019-12-19 | End: 2019-12-21

## 2019-12-19 RX ORDER — OXYCODONE HYDROCHLORIDE 5 MG/1
5 TABLET ORAL
Refills: 0 | Status: DISCONTINUED | OUTPATIENT
Start: 2019-12-19 | End: 2019-12-21

## 2019-12-19 RX ORDER — IBUPROFEN 200 MG
600 TABLET ORAL EVERY 6 HOURS
Refills: 0 | Status: DISCONTINUED | OUTPATIENT
Start: 2019-12-19 | End: 2019-12-21

## 2019-12-19 RX ADMIN — OXYCODONE HYDROCHLORIDE 5 MILLIGRAM(S): 5 TABLET ORAL at 06:40

## 2019-12-19 RX ADMIN — Medication 975 MILLIGRAM(S): at 16:25

## 2019-12-19 RX ADMIN — OXYCODONE HYDROCHLORIDE 5 MILLIGRAM(S): 5 TABLET ORAL at 11:45

## 2019-12-19 RX ADMIN — Medication 600 MILLIGRAM(S): at 12:45

## 2019-12-19 RX ADMIN — Medication 975 MILLIGRAM(S): at 20:47

## 2019-12-19 RX ADMIN — HEPARIN SODIUM 10000 UNIT(S): 5000 INJECTION INTRAVENOUS; SUBCUTANEOUS at 08:00

## 2019-12-19 RX ADMIN — Medication 325 MILLIGRAM(S): at 11:45

## 2019-12-19 RX ADMIN — OXYCODONE HYDROCHLORIDE 5 MILLIGRAM(S): 5 TABLET ORAL at 15:31

## 2019-12-19 RX ADMIN — Medication 1 TABLET(S): at 11:45

## 2019-12-19 RX ADMIN — OXYCODONE HYDROCHLORIDE 5 MILLIGRAM(S): 5 TABLET ORAL at 20:46

## 2019-12-19 RX ADMIN — OXYCODONE HYDROCHLORIDE 5 MILLIGRAM(S): 5 TABLET ORAL at 18:11

## 2019-12-19 RX ADMIN — Medication 250 MILLILITER(S): at 00:05

## 2019-12-19 RX ADMIN — SENNA PLUS 1 TABLET(S): 8.6 TABLET ORAL at 11:45

## 2019-12-19 RX ADMIN — OXYCODONE HYDROCHLORIDE 5 MILLIGRAM(S): 5 TABLET ORAL at 16:25

## 2019-12-19 RX ADMIN — OXYCODONE HYDROCHLORIDE 5 MILLIGRAM(S): 5 TABLET ORAL at 12:40

## 2019-12-19 RX ADMIN — Medication 30 MILLIGRAM(S): at 00:32

## 2019-12-19 RX ADMIN — Medication 30 MILLIGRAM(S): at 06:07

## 2019-12-19 RX ADMIN — HEPARIN SODIUM 10000 UNIT(S): 5000 INJECTION INTRAVENOUS; SUBCUTANEOUS at 20:45

## 2019-12-19 RX ADMIN — Medication 200 MILLIGRAM(S): at 18:11

## 2019-12-19 RX ADMIN — Medication 1 APPLICATION(S): at 20:46

## 2019-12-19 RX ADMIN — Medication 1 MILLIGRAM(S): at 15:31

## 2019-12-19 RX ADMIN — Medication 325 MILLIGRAM(S): at 18:11

## 2019-12-19 RX ADMIN — Medication 975 MILLIGRAM(S): at 03:18

## 2019-12-19 RX ADMIN — Medication 200 MILLIGRAM(S): at 07:59

## 2019-12-19 RX ADMIN — Medication 975 MILLIGRAM(S): at 10:00

## 2019-12-19 RX ADMIN — Medication 975 MILLIGRAM(S): at 15:31

## 2019-12-19 RX ADMIN — Medication 975 MILLIGRAM(S): at 21:45

## 2019-12-19 RX ADMIN — Medication 500 MILLIGRAM(S): at 11:45

## 2019-12-19 RX ADMIN — Medication 600 MILLIGRAM(S): at 11:50

## 2019-12-19 RX ADMIN — Medication 600 MILLIGRAM(S): at 18:11

## 2019-12-19 RX ADMIN — Medication 975 MILLIGRAM(S): at 09:06

## 2019-12-19 RX ADMIN — OXYCODONE HYDROCHLORIDE 5 MILLIGRAM(S): 5 TABLET ORAL at 21:45

## 2019-12-19 RX ADMIN — Medication 975 MILLIGRAM(S): at 02:18

## 2019-12-19 RX ADMIN — Medication 30 MILLIGRAM(S): at 05:07

## 2019-12-19 NOTE — PROGRESS NOTE ADULT - SUBJECTIVE AND OBJECTIVE BOX
OB Progress Note:  Delivery, POD#1    S: 28yo with cHTN now POD#1 s/p rLTCS for TIUP at 36wk. Her pain is well controlled. She is tolerating a regular diet and passing flatus. Patient endorses LE edema but not calf tenderness or erythema. Denies N/V. Denies CP/SOB/lightheadedness/dizziness. Endorses light vaginal bleeding, less than one pad per hour. She is ambulating without difficulty. Voiding spontaneously.     O:   Vital Signs Last 24 Hrs  T(C): 36.7 (19 Dec 2019 00:55), Max: 36.9 (18 Dec 2019 09:57)  T(F): 98.1 (19 Dec 2019 00:55), Max: 98.4 (18 Dec 2019 09:57)  HR: 72 (19 Dec 2019 00:55) (59 - 72)  BP: 113/72 (19 Dec 2019 00:55) (113/72 - 136/83)  BP(mean): --  RR: 18 (19 Dec 2019 00:55) (16 - 20)  SpO2: 98% (19 Dec 2019 00:55) (95% - 99%)    PE:  General: NAD  Heart: extremities well-perfused  Lungs: breathing comfortably  Abdomen: Mildly distended, appropriately tender, fundus firm, incision c/d/i  Extremities: No erythema, no pitting edema    Labs:  Blood type: O Positive  Rubella IgG: RPR: Negative                          11.4<L>   15.75<H> >-----------< 218    (  @ 07:13 )             33.5<L>                        12.3   10.98<H> >-----------< 237    (  @ 18:45 )             37.2    19 @ 07:13      136  |  105  |  10  ----------------------------<  98  4.5   |  19<L>  |  0.81    19 @ 18:45      137  |  105  |  9   ----------------------------<  67<L>  4.3   |  20<L>  |  0.90        Ca    8.9      18 Dec 2019 07:13  Ca    9.5      17 Dec 2019 18:45    TPro  5.7<L>  /  Alb  2.7<L>  /  TBili  0.3  /  DBili  x   /  AST  21  /  ALT  17  /  AlkPhos  133<H>  19 @ 07:13  TPro  6.4  /  Alb  3.3  /  TBili  0.3  /  DBili  x   /  AST  22  /  ALT  18  /  AlkPhos  155<H>  19 @ 18:45      UOP: 200cc/2.5hr = 80cc/hr

## 2019-12-19 NOTE — PROGRESS NOTE ADULT - SUBJECTIVE AND OBJECTIVE BOX
Day 1 of Anesthesia Pain Management Service    SUBJECTIVE: I'm okay  Pain Scale Score:    [X] Refer to charted pain scores    THERAPY: Epidural Bupivacaine 0.01 % and Fentanyl 3 micrograms/mL     Demand Dose: 3 mL  Lockout: 15 minutes   Continuous Rate:  10 mL    MEDICATIONS  (STANDING):  acetaminophen   Tablet .. 975 milliGRAM(s) Oral <User Schedule>  diphtheria/tetanus/pertussis (acellular) Vaccine (ADAcel) 0.5 milliLiter(s) IntraMuscular once  ferrous    sulfate 325 milliGRAM(s) Oral daily  folic acid 1 milliGRAM(s) Oral daily  heparin  Injectable 95546 Unit(s) SubCutaneous every 12 hours  ibuprofen  Tablet. 600 milliGRAM(s) Oral every 6 hours  ketorolac   Injectable 30 milliGRAM(s) IV Push every 6 hours  labetalol 200 milliGRAM(s) Oral every 12 hours  lactated ringers. 500 milliLiter(s) (50 mL/Hr) IV Continuous <Continuous>  oxytocin Infusion 333.333 milliUNIT(s)/Min (1000 mL/Hr) IV Continuous <Continuous>  prenatal multivitamin 1 Tablet(s) Oral daily    MEDICATIONS  (PRN):  diphenhydrAMINE 25 milliGRAM(s) Oral every 6 hours PRN Itching  glycerin Suppository - Adult 1 Suppository(s) Rectal at bedtime PRN Constipation  lanolin Ointment 1 Application(s) Topical every 6 hours PRN Sore Nipples  magnesium hydroxide Suspension 30 milliLiter(s) Oral two times a day PRN Constipation  oxyCODONE    IR 5 milliGRAM(s) Oral every 3 hours PRN Moderate to Severe Pain (4-10)  oxyCODONE    IR 5 milliGRAM(s) Oral once PRN Moderate to Severe Pain (4-10)  simethicone 80 milliGRAM(s) Chew every 4 hours PRN Gas  sodium chloride 0.65% Nasal 1 Spray(s) Both Nostrils four times a day PRN Nasal Congestion      OBJECTIVE:    Assessment of Epidural Catheter Site: 	    [ ] Dressing intact	[X] Site non-tender	[X] Site without erythema, discharge, edema  [ ] Epidural tubing and connection checked	[X] Gross neurological exam within normal limits  [X] Catheter removed    PT/INR - ( 19 Dec 2019 05:13 )   PT: 10.8 sec;   INR: 0.94 ratio         PTT - ( 19 Dec 2019 05:13 )  PTT:27.3 sec                      8.3    15.94 )-----------( 182      ( 19 Dec 2019 05:13 )             24.8     Vital Signs Last 24 Hrs  T(C): 36.8 (12-19-19 @ 09:46), Max: 36.8 (12-18-19 @ 20:10)  T(F): 98.2 (12-19-19 @ 09:46), Max: 98.2 (12-18-19 @ 20:10)  HR: 78 (12-19-19 @ 09:46) (65 - 78)  BP: 128/82 (12-19-19 @ 09:46) (113/72 - 130/84)  BP(mean): --  RR: 18 (12-19-19 @ 09:46) (16 - 18)  SpO2: 99% (12-19-19 @ 09:46) (96% - 99%)      Sedation Score:	[X] Alert 	[ ] Drowsy	[ ] Arousable  [ ] Asleep  [ ] Unresponsive    Side Effects:	[X] None	 [ ] Nausea	[ ] Vomiting  [ ] Pruritus  		[ ] Weakness      [ ] Numbness  [ ] Other:    ASSESSMENT/ PLAN:    Therapy:                         [ ] Continue   [X] Discontinue   [X] Change to PRN Analgesics    Documentation and Verification of current medications:  [X] Done	[ ] Not done, not eligible, reason:    Comments:

## 2019-12-19 NOTE — PROGRESS NOTE ADULT - PROBLEM SELECTOR PLAN 1
- Continue PO hydration, minimal IV fluids  - F/u LE dopplers  - BPs at goal. Continue labetalol 200mg BID  - No evidence of siPEC with severe features but will f/u AM HELLP labs  - Continue regular diet.  - Increase ambulation.  - Continue motrin, tylenol, oxycodone PRN for pain control.    - Patient asymptomatic with no evidence of ongoing bleeding.    Mikayla Rubio, PGY-1

## 2019-12-19 NOTE — PROGRESS NOTE ADULT - ASSESSMENT
A/P: 30yo with cHTN now POD#1 s/p rLTCS for TIUP at 36wk with oliguria and pitting edema of bilateral LE without calf tenderness or erythema.

## 2019-12-19 NOTE — CHART NOTE - NSCHARTNOTEFT_GEN_A_CORE
PA Anemia NOTE     POD#1       Vital Signs Last 24 Hrs  T(C): 36.8 (19 Dec 2019 09:46), Max: 36.8 (18 Dec 2019 20:10)  T(F): 98.2 (19 Dec 2019 09:46), Max: 98.2 (18 Dec 2019 20:10)  HR: 78 (19 Dec 2019 09:46) (65 - 78)  BP: 128/82 (19 Dec 2019 09:46) (113/72 - 130/84)  RR: 18 (19 Dec 2019 09:46) (16 - 18)  SpO2: 99% (19 Dec 2019 09:46) (96% - 99%)               8.3    15.94 )-----------( 182      ( -19 @ 05:13 )             24.8                11.4   15.75 )-----------( 218      ( 12-18 @ 07:13 )             33.5                12.3   10.98 )-----------( 237      ( 12-17 @ 18:45 )             37.2     Assessment:  29  y.o. S/P  C/S POD # 1 with Anemia due to acute blood loss-VSS/Asx-not requiring blood tranfusion for Iron Supplementation  Plan:  - Ferrous Sulfate, Colace, Vitamin C supplementation.  - Repeat CBC POD # 3  - Monitor for signs/symptoms of anemia.     PAULY Betancourt

## 2019-12-20 ENCOUNTER — APPOINTMENT (OUTPATIENT)
Dept: ANTEPARTUM | Facility: CLINIC | Age: 29
End: 2019-12-20

## 2019-12-20 ENCOUNTER — TRANSCRIPTION ENCOUNTER (OUTPATIENT)
Age: 29
End: 2019-12-20

## 2019-12-20 RX ORDER — IBUPROFEN 200 MG
1 TABLET ORAL
Qty: 0 | Refills: 0 | DISCHARGE
Start: 2019-12-20

## 2019-12-20 RX ORDER — ASPIRIN/CALCIUM CARB/MAGNESIUM 324 MG
1.5 TABLET ORAL
Qty: 0 | Refills: 0 | DISCHARGE

## 2019-12-20 RX ORDER — ACETAMINOPHEN 500 MG
3 TABLET ORAL
Qty: 0 | Refills: 0 | DISCHARGE
Start: 2019-12-20

## 2019-12-20 RX ORDER — FERROUS SULFATE 325(65) MG
1 TABLET ORAL
Qty: 0 | Refills: 0 | DISCHARGE

## 2019-12-20 RX ORDER — VALACYCLOVIR 500 MG/1
1 TABLET, FILM COATED ORAL
Qty: 0 | Refills: 0 | DISCHARGE

## 2019-12-20 RX ORDER — LABETALOL HCL 100 MG
1 TABLET ORAL
Qty: 0 | Refills: 0 | DISCHARGE

## 2019-12-20 RX ORDER — OXYCODONE HYDROCHLORIDE 5 MG/1
5 TABLET ORAL ONCE
Refills: 0 | Status: DISCONTINUED | OUTPATIENT
Start: 2019-12-20 | End: 2019-12-21

## 2019-12-20 RX ORDER — FOLIC ACID 0.8 MG
1 TABLET ORAL
Qty: 0 | Refills: 0 | DISCHARGE

## 2019-12-20 RX ADMIN — OXYCODONE HYDROCHLORIDE 5 MILLIGRAM(S): 5 TABLET ORAL at 20:19

## 2019-12-20 RX ADMIN — Medication 600 MILLIGRAM(S): at 14:06

## 2019-12-20 RX ADMIN — HEPARIN SODIUM 10000 UNIT(S): 5000 INJECTION INTRAVENOUS; SUBCUTANEOUS at 20:21

## 2019-12-20 RX ADMIN — Medication 975 MILLIGRAM(S): at 20:19

## 2019-12-20 RX ADMIN — OXYCODONE HYDROCHLORIDE 5 MILLIGRAM(S): 5 TABLET ORAL at 03:23

## 2019-12-20 RX ADMIN — Medication 600 MILLIGRAM(S): at 06:48

## 2019-12-20 RX ADMIN — OXYCODONE HYDROCHLORIDE 5 MILLIGRAM(S): 5 TABLET ORAL at 00:21

## 2019-12-20 RX ADMIN — Medication 200 MILLIGRAM(S): at 17:57

## 2019-12-20 RX ADMIN — Medication 600 MILLIGRAM(S): at 07:30

## 2019-12-20 RX ADMIN — OXYCODONE HYDROCHLORIDE 5 MILLIGRAM(S): 5 TABLET ORAL at 06:48

## 2019-12-20 RX ADMIN — OXYCODONE HYDROCHLORIDE 5 MILLIGRAM(S): 5 TABLET ORAL at 04:20

## 2019-12-20 RX ADMIN — Medication 600 MILLIGRAM(S): at 01:20

## 2019-12-20 RX ADMIN — OXYCODONE HYDROCHLORIDE 5 MILLIGRAM(S): 5 TABLET ORAL at 01:20

## 2019-12-20 RX ADMIN — Medication 600 MILLIGRAM(S): at 14:45

## 2019-12-20 RX ADMIN — Medication 600 MILLIGRAM(S): at 00:21

## 2019-12-20 RX ADMIN — Medication 325 MILLIGRAM(S): at 17:57

## 2019-12-20 RX ADMIN — Medication 1 MILLIGRAM(S): at 14:25

## 2019-12-20 RX ADMIN — Medication 1 TABLET(S): at 16:21

## 2019-12-20 RX ADMIN — Medication 975 MILLIGRAM(S): at 21:20

## 2019-12-20 RX ADMIN — OXYCODONE HYDROCHLORIDE 5 MILLIGRAM(S): 5 TABLET ORAL at 11:15

## 2019-12-20 RX ADMIN — Medication 975 MILLIGRAM(S): at 03:23

## 2019-12-20 RX ADMIN — OXYCODONE HYDROCHLORIDE 5 MILLIGRAM(S): 5 TABLET ORAL at 10:31

## 2019-12-20 RX ADMIN — Medication 325 MILLIGRAM(S): at 06:48

## 2019-12-20 RX ADMIN — HEPARIN SODIUM 10000 UNIT(S): 5000 INJECTION INTRAVENOUS; SUBCUTANEOUS at 08:16

## 2019-12-20 RX ADMIN — Medication 500 MILLIGRAM(S): at 14:05

## 2019-12-20 RX ADMIN — OXYCODONE HYDROCHLORIDE 5 MILLIGRAM(S): 5 TABLET ORAL at 21:20

## 2019-12-20 RX ADMIN — Medication 975 MILLIGRAM(S): at 10:31

## 2019-12-20 RX ADMIN — Medication 975 MILLIGRAM(S): at 04:20

## 2019-12-20 RX ADMIN — Medication 975 MILLIGRAM(S): at 11:15

## 2019-12-20 RX ADMIN — Medication 325 MILLIGRAM(S): at 14:05

## 2019-12-20 RX ADMIN — OXYCODONE HYDROCHLORIDE 5 MILLIGRAM(S): 5 TABLET ORAL at 07:30

## 2019-12-20 NOTE — PROGRESS NOTE ADULT - PROBLEM SELECTOR PLAN 1
- LE dopplers (12/19) negative for DVT  - Continue PO hydration  - HELLP labs normal  - Continue regular diet.  - Increase ambulation.  - Continue motrin, tylenol, oxycodone PRN for pain control.      Mikayla Rubio, PGY-1 - LE dopplers (12/19) negative for DVT  - Continue PO hydration  - BPs well-controlled  - HELLP labs normal  - Continue regular diet.  - Increase ambulation.  - Continue motrin, tylenol, oxycodone PRN for pain control.    - Patient declines birth control as partner plans to get a vasectomy    Mikayla Rubio, PGY-1

## 2019-12-20 NOTE — DISCHARGE NOTE OB - HOSPITAL COURSE
Patient underwent uncomplicated rLTCS. EBL 1L, H/H stable, Pts post-operative course was notable for POD0 urinary retention with pugh replaced, however was voiding spontaneously upon discharge. She remained hemodynamically stable and afebrile throughout. Upon discharge on POD3, the patient is ambulating and voiding spontaneously, tolerating oral intake. Pain was well controlled with oral medication and vital signs remain stable.

## 2019-12-20 NOTE — DISCHARGE NOTE OB - MEDICATION SUMMARY - MEDICATIONS TO TAKE
I will START or STAY ON the medications listed below when I get home from the hospital:    acetaminophen 325 mg oral tablet  -- 3 tab(s) by mouth   -- Indication: For pain    ibuprofen 600 mg oral tablet  -- 1 tab(s) by mouth every 6 hours  -- Indication: For pain    Prenatal Multivitamins oral tablet  -- 1 tab(s) by mouth once a day  -- Indication: For breast feeding

## 2019-12-20 NOTE — PROGRESS NOTE ADULT - SUBJECTIVE AND OBJECTIVE BOX
OB Progress Note:  Delivery, POD#2    S:  30yo with cHTN now POD#2 s/p rLTCS for TIUP at 36wk. Her pain is well controlled. She is tolerating a regular diet and passing flatus. Continues to endorse LE edema, L > R. Denies calf pain or erythema. Denies N/V. Denies CP/SOB/lightheadedness/dizziness. Endorses light vaginal bleeding, less than one pad per hour. She is ambulating without difficulty. Voiding spontaneously.     O:   Vital Signs Last 24 Hrs  T(C): 36.5 (19 Dec 2019 21:37), Max: 36.8 (19 Dec 2019 09:46)  T(F): 97.7 (19 Dec 2019 21:37), Max: 98.2 (19 Dec 2019 09:46)  HR: 76 (19 Dec 2019 21:37) (76 - 78)  BP: 108/75 (19 Dec 2019 21:37) (108/75 - 131/82)  BP(mean): --  RR: 18 (19 Dec 2019 21:37) (18 - 18)  SpO2: 97% (19 Dec 2019 21:37) (97% - 99%)    PE:  General: NAD  Heart: extremities well-perfused  Lungs: breathing comfortably  Abdomen: Mildly distended, appropriately tender, fundus firm, incision c/d/i  Extremities: 3+ pitting edema of b/l LE up to the knee, L>R, no calf tenderness or erythema    Labs:  Blood type: O Positive  Rubella IgG: RPR: Negative                          8.3<L>   15.94<H> >-----------< 182    (  @ 05:13 )             24.8<L>                        11.4<L>   15.75<H> >-----------< 218    (  @ 07:13 )             33.5<L>                        12.3   10.98<H> >-----------< 237    (  @ 18:45 )             37.2    19 @ 05:13      136  |  103  |  15  ----------------------------<  101<H>  4.2   |  21<L>  |  0.95    19 @ 07:13      136  |  105  |  10  ----------------------------<  98  4.5   |  19<L>  |  0.81    19 @ 18:45      137  |  105  |  9   ----------------------------<  67<L>  4.3   |  20<L>  |  0.90        Ca    8.8      19 Dec 2019 05:13  Ca    8.9      18 Dec 2019 07:13  Ca    9.5      17 Dec 2019 18:45    TPro  4.7<L>  /  Alb  2.5<L>  /  TBili  0.1<L>  /  DBili  x   /  AST  19  /  ALT  14  /  AlkPhos  103  19 @ 05:13  TPro  5.7<L>  /  Alb  2.7<L>  /  TBili  0.3  /  DBili  x   /  AST  21  /  ALT  17  /  AlkPhos  133<H>  19 @ 07:13  TPro  6.4  /  Alb  3.3  /  TBili  0.3  /  DBili  x   /  AST  22  /  ALT  18  /  AlkPhos  155<H>  19 @ 18:45    Voiding spontaneously    LE dopplers (): no evidence of DVT OB Progress Note:  Delivery, POD#2    S:  28yo with cHTN now POD#2 s/p rLTCS for TIUP at 36wk. Her pain is well controlled. She is tolerating a regular diet and passing flatus. Continues to endorse LE edema, L > R. Denies calf pain or erythema. Denies N/V. Denies CP/SOB/lightheadedness/dizziness/vision changes. Endorses light vaginal bleeding, less than one pad per hour. She is ambulating without difficulty. Voiding spontaneously.     O:   Vital Signs Last 24 Hrs  T(C): 36.5 (19 Dec 2019 21:37), Max: 36.8 (19 Dec 2019 09:46)  T(F): 97.7 (19 Dec 2019 21:37), Max: 98.2 (19 Dec 2019 09:46)  HR: 76 (19 Dec 2019 21:37) (76 - 78)  BP: 108/75 (19 Dec 2019 21:37) (108/75 - 131/82)  BP(mean): --  RR: 18 (19 Dec 2019 21:37) (18 - 18)  SpO2: 97% (19 Dec 2019 21:37) (97% - 99%)    PE:  General: NAD  Heart: extremities well-perfused, RRR  Lungs: breathing comfortably, CTA b/l  Abdomen: Mildly distended, appropriately tender, fundus firm, incision c/d/i  Extremities: 3+ pitting edema of b/l LE up to the knee, L>R, no calf tenderness or erythema    Labs:  Blood type: O Positive  Rubella IgG: RPR: Negative                          8.3<L>   15.94<H> >-----------< 182    (  @ 05:13 )             24.8<L>                        11.4<L>   15.75<H> >-----------< 218    (  @ 07:13 )             33.5<L>                        12.3   10.98<H> >-----------< 237    (  @ 18:45 )             37.2    19 @ 05:13      136  |  103  |  15  ----------------------------<  101<H>  4.2   |  21<L>  |  0.95    19 @ 07:13      136  |  105  |  10  ----------------------------<  98  4.5   |  19<L>  |  0.81    19 @ 18:45      137  |  105  |  9   ----------------------------<  67<L>  4.3   |  20<L>  |  0.90        Ca    8.8      19 Dec 2019 05:13  Ca    8.9      18 Dec 2019 07:13  Ca    9.5      17 Dec 2019 18:45    TPro  4.7<L>  /  Alb  2.5<L>  /  TBili  0.1<L>  /  DBili  x   /  AST  19  /  ALT  14  /  AlkPhos  103  19 @ 05:13  TPro  5.7<L>  /  Alb  2.7<L>  /  TBili  0.3  /  DBili  x   /  AST  21  /  ALT  17  /  AlkPhos  133<H>  19 @ 07:13  TPro  6.4  /  Alb  3.3  /  TBili  0.3  /  DBili  x   /  AST  22  /  ALT  18  /  AlkPhos  155<H>  19 @ 18:45    Voiding spontaneously    LE dopplers (): no evidence of DVT

## 2019-12-20 NOTE — DISCHARGE NOTE OB - PLAN OF CARE
recovery Make your follow-up appointment with your doctor in 6 weeks for a post-partum check. No heavy lifting, driving, or strenuous activity for 6 weeks. Nothing per vagina such as tampons, intercourse, douches, or tub baths for 6 weeks or until you see your doctor. Call your doctor with any signs and symptoms of infection such as fever, chills, nausea, or vomiting. Call your doctor if you're unable to tolerate food, increase in vaginal bleeding, or have difficulty urinating. Call your doctor if you have pain that is not relieved by your prescribed medications. Notify your doctor with any other concerns.   Call [phone] if you have any of these concerns in the next 6 weeks.

## 2019-12-20 NOTE — PROGRESS NOTE ADULT - ASSESSMENT
A/P:  30yo with cHTN now POD#2 s/p rLTCS for TIUP at 36wk   Patient has significant LE edema without evidence of DVT. She is stable and has no acute complaints.

## 2019-12-20 NOTE — DISCHARGE NOTE OB - PROVIDER TOKENS
FREE:[LAST:[[facility]],PHONE:[phone]],FAX:[(   )    -],ADDRESS:[[address] [place] [address]]]

## 2019-12-20 NOTE — DISCHARGE NOTE OB - PATIENT PORTAL LINK FT
You can access the FollowMyHealth Patient Portal offered by [facility] by registering at the following website: [email or web address]. By joining [employer]’s FollowMyHealth portal, you will also be able to view your health information using other applications (apps) compatible with our system.

## 2019-12-20 NOTE — DISCHARGE NOTE OB - CARE PLAN
Principal Discharge DX:	 delivery delivered  Goal:	recovery  Assessment and plan of treatment:	Make your follow-up appointment with your doctor in 6 weeks for a post-partum check. No heavy lifting, driving, or strenuous activity for 6 weeks. Nothing per vagina such as tampons, intercourse, douches, or tub baths for 6 weeks or until you see your doctor. Call your doctor with any signs and symptoms of infection such as fever, chills, nausea, or vomiting. Call your doctor if you're unable to tolerate food, increase in vaginal bleeding, or have difficulty urinating. Call your doctor if you have pain that is not relieved by your prescribed medications. Notify your doctor with any other concerns.   Call [phone] if you have any of these concerns in the next 6 weeks.

## 2019-12-20 NOTE — DISCHARGE NOTE OB - CARE PROVIDER_API CALL
[facility],   [address] [place] [address]  2nd floor  Phone: [phone]  Fax: (   )    -  Follow Up Time:

## 2019-12-21 ENCOUNTER — NON-APPOINTMENT (OUTPATIENT)
Age: 29
End: 2019-12-21

## 2019-12-21 VITALS
DIASTOLIC BLOOD PRESSURE: 83 MMHG | SYSTOLIC BLOOD PRESSURE: 136 MMHG | HEART RATE: 75 BPM | TEMPERATURE: 98 F | RESPIRATION RATE: 18 BRPM

## 2019-12-21 LAB
HCT VFR BLD CALC: 25.7 % — LOW (ref 34.5–45)
HGB BLD-MCNC: 8.6 G/DL — LOW (ref 11.5–15.5)
MCHC RBC-ENTMCNC: 31.3 PG — SIGNIFICANT CHANGE UP (ref 27–34)
MCHC RBC-ENTMCNC: 33.5 GM/DL — SIGNIFICANT CHANGE UP (ref 32–36)
MCV RBC AUTO: 93.5 FL — SIGNIFICANT CHANGE UP (ref 80–100)
NRBC # BLD: 0 /100 WBCS — SIGNIFICANT CHANGE UP (ref 0–0)
PLATELET # BLD AUTO: 231 K/UL — SIGNIFICANT CHANGE UP (ref 150–400)
RBC # BLD: 2.75 M/UL — LOW (ref 3.8–5.2)
RBC # FLD: 14.2 % — SIGNIFICANT CHANGE UP (ref 10.3–14.5)
WBC # BLD: 12.82 K/UL — HIGH (ref 3.8–10.5)
WBC # FLD AUTO: 12.82 K/UL — HIGH (ref 3.8–10.5)

## 2019-12-21 PROCEDURE — C1889: CPT

## 2019-12-21 PROCEDURE — 85730 THROMBOPLASTIN TIME PARTIAL: CPT

## 2019-12-21 PROCEDURE — 86850 RBC ANTIBODY SCREEN: CPT

## 2019-12-21 PROCEDURE — 86780 TREPONEMA PALLIDUM: CPT

## 2019-12-21 PROCEDURE — 82570 ASSAY OF URINE CREATININE: CPT

## 2019-12-21 PROCEDURE — 88307 TISSUE EXAM BY PATHOLOGIST: CPT

## 2019-12-21 PROCEDURE — 84550 ASSAY OF BLOOD/URIC ACID: CPT

## 2019-12-21 PROCEDURE — 93970 EXTREMITY STUDY: CPT

## 2019-12-21 PROCEDURE — 59050 FETAL MONITOR W/REPORT: CPT

## 2019-12-21 PROCEDURE — 84156 ASSAY OF PROTEIN URINE: CPT

## 2019-12-21 PROCEDURE — 85610 PROTHROMBIN TIME: CPT

## 2019-12-21 PROCEDURE — 86901 BLOOD TYPING SEROLOGIC RH(D): CPT

## 2019-12-21 PROCEDURE — 85384 FIBRINOGEN ACTIVITY: CPT

## 2019-12-21 PROCEDURE — 81001 URINALYSIS AUTO W/SCOPE: CPT

## 2019-12-21 PROCEDURE — 83615 LACTATE (LD) (LDH) ENZYME: CPT

## 2019-12-21 PROCEDURE — 85027 COMPLETE CBC AUTOMATED: CPT

## 2019-12-21 PROCEDURE — G0463: CPT

## 2019-12-21 PROCEDURE — 80053 COMPREHEN METABOLIC PANEL: CPT

## 2019-12-21 PROCEDURE — 86900 BLOOD TYPING SEROLOGIC ABO: CPT

## 2019-12-21 PROCEDURE — 59025 FETAL NON-STRESS TEST: CPT

## 2019-12-21 RX ADMIN — OXYCODONE HYDROCHLORIDE 5 MILLIGRAM(S): 5 TABLET ORAL at 06:46

## 2019-12-21 RX ADMIN — OXYCODONE HYDROCHLORIDE 5 MILLIGRAM(S): 5 TABLET ORAL at 07:15

## 2019-12-21 RX ADMIN — Medication 600 MILLIGRAM(S): at 00:19

## 2019-12-21 RX ADMIN — Medication 600 MILLIGRAM(S): at 06:46

## 2019-12-21 RX ADMIN — OXYCODONE HYDROCHLORIDE 5 MILLIGRAM(S): 5 TABLET ORAL at 10:45

## 2019-12-21 RX ADMIN — HEPARIN SODIUM 10000 UNIT(S): 5000 INJECTION INTRAVENOUS; SUBCUTANEOUS at 08:49

## 2019-12-21 RX ADMIN — OXYCODONE HYDROCHLORIDE 5 MILLIGRAM(S): 5 TABLET ORAL at 01:20

## 2019-12-21 RX ADMIN — Medication 325 MILLIGRAM(S): at 11:40

## 2019-12-21 RX ADMIN — Medication 600 MILLIGRAM(S): at 07:15

## 2019-12-21 RX ADMIN — Medication 600 MILLIGRAM(S): at 01:20

## 2019-12-21 RX ADMIN — SENNA PLUS 1 TABLET(S): 8.6 TABLET ORAL at 11:40

## 2019-12-21 RX ADMIN — Medication 1 TABLET(S): at 10:45

## 2019-12-21 RX ADMIN — OXYCODONE HYDROCHLORIDE 5 MILLIGRAM(S): 5 TABLET ORAL at 04:05

## 2019-12-21 RX ADMIN — Medication 500 MILLIGRAM(S): at 11:40

## 2019-12-21 RX ADMIN — Medication 975 MILLIGRAM(S): at 04:05

## 2019-12-21 RX ADMIN — Medication 1 MILLIGRAM(S): at 11:40

## 2019-12-21 RX ADMIN — OXYCODONE HYDROCHLORIDE 5 MILLIGRAM(S): 5 TABLET ORAL at 03:05

## 2019-12-21 RX ADMIN — Medication 200 MILLIGRAM(S): at 06:46

## 2019-12-21 RX ADMIN — Medication 975 MILLIGRAM(S): at 11:15

## 2019-12-21 RX ADMIN — OXYCODONE HYDROCHLORIDE 5 MILLIGRAM(S): 5 TABLET ORAL at 01:15

## 2019-12-21 RX ADMIN — Medication 975 MILLIGRAM(S): at 10:45

## 2019-12-21 RX ADMIN — Medication 325 MILLIGRAM(S): at 06:46

## 2019-12-21 RX ADMIN — Medication 975 MILLIGRAM(S): at 03:05

## 2019-12-21 RX ADMIN — SIMETHICONE 80 MILLIGRAM(S): 80 TABLET, CHEWABLE ORAL at 06:49

## 2019-12-21 RX ADMIN — OXYCODONE HYDROCHLORIDE 5 MILLIGRAM(S): 5 TABLET ORAL at 00:20

## 2019-12-21 NOTE — CHART NOTE - NSCHARTNOTEFT_GEN_A_CORE
DASIA PALAFOX Postpartum    POD#3    Vital Signs Last 24 Hrs  T(C): 36.6 (21 Dec 2019 05:00), Max: 36.9 (20 Dec 2019 09:04)  T(F): 97.9 (21 Dec 2019 05:00), Max: 98.5 (20 Dec 2019 09:04)  HR: 78 (21 Dec 2019 05:00) (66 - 78)  BP: 128/85 (21 Dec 2019 05:00) (122/78 - 143/85)  RR: 18 (21 Dec 2019 05:00) (18 - 18)  SpO2: 97% (21 Dec 2019 05:00) (96% - 98%)                          8.6    12.82 )-----------( 231      ( 21 Dec 2019 06:57 )             25.7   baso x      eos x      imm gran x      lymph x      mono x      poly x                            8.3    15.94 )-----------( 182      ( 19 Dec 2019 05:13 )             24.8   baso 0.2    eos 0.2    imm gran 0.6    lymph 19.9   mono 6.6    poly 72.5         Plan: Anemia secondary to acute blood loss due to delivery.   - Ferrous Sulfate and vitamin C supplementation.  - Monitor for signs/symptoms of anemia.  No transfusion needed at this time.  VIVIENNE Covarrubias

## 2019-12-21 NOTE — PROGRESS NOTE ADULT - PROBLEM SELECTOR PLAN 1
- Continue regular diet.  - Increase ambulation.  - Continue motrin, tylenol, oxycodone PRN for pain control.    - Patient asymptomatic with no evidence of ongoing bleeding.  - BPs well controlled  - Discharge planning    Mikayla Rubio, PGY-1 - Continue regular diet.  - Increase ambulation.  - Continue motrin, tylenol, oxycodone PRN for pain control.    - Patient asymptomatic with no evidence of ongoing bleeding.  - BPs well controlled. Continue labetalol 200mg BID.  - Discharge planning    Mikayla Rubio, PGY-1

## 2019-12-21 NOTE — PROGRESS NOTE ADULT - SUBJECTIVE AND OBJECTIVE BOX
OB Progress Note:  Delivery, POD#3    S: 30yo with cHTN now POD#3 s/p rLTCS for TIUP at 36wk. Her pain is well controlled. She is tolerating a regular diet and passing flatus. Continues to endorse severe LE swelling but no calf tenderness or erythema. Patient states the skin just feels tight. Denies N/V. Denies CP/SOB/lightheadedness/dizziness. Endorses light vaginal bleeding, less than one pad per hour. She is ambulating without difficulty. Voiding spontaneously.     O:   Vital Signs Last 24 Hrs  T(C): 36.7 (20 Dec 2019 21:00), Max: 36.9 (20 Dec 2019 06:21)  T(F): 98.1 (20 Dec 2019 21:00), Max: 98.5 (20 Dec 2019 06:21)  HR: 67 (20 Dec 2019 21:00) (66 - 73)  BP: 126/76 (20 Dec 2019 21:00) (118/75 - 143/85)  BP(mean): --  RR: 18 (20 Dec 2019 21:00) (18 - 18)  SpO2: 98% (20 Dec 2019 21:00) (96% - 98%)    PE:  General: NAD  Heart: extremities well-perfused  Lungs: breathing comfortably  Abdomen: Mildly distended, appropriately tender, fundus firm, incision c/d/i   Extremities: 3+ pitting edema of b/l LE, L>R, no calf tenderness or erythema    Labs:  Blood type: O Positive  Rubella IgG: RPR: Negative                          8.3<L>   15.94<H> >-----------< 182    (  @ 05:13 )             24.8<L>                        11.4<L>   15.75<H> >-----------< 218    (  07:13 )             33.5<L>    19 @ 05:13      136  |  103  |  15  ----------------------------<  101<H>  4.2   |  21<L>  |  0.95    19 @ 07:13      136  |  105  |  10  ----------------------------<  98  4.5   |  19<L>  |  0.81        Ca    8.8      19 Dec 2019 05:13  Ca    8.9      18 Dec 2019 07:13    TPro  4.7<L>  /  Alb  2.5<L>  /  TBili  0.1<L>  /  DBili  x   /  AST  19  /  ALT  14  /  AlkPhos  103  19 @ 05:13  TPro  5.7<L>  /  Alb  2.7<L>  /  TBili  0.3  /  DBili  x   /  AST  21  /  ALT  17  /  AlkPhos  133<H>  19 @ 07:13

## 2019-12-21 NOTE — PROGRESS NOTE ADULT - ASSESSMENT
A/P: 28yo with cHTN now POD#3 s/p rLTCS for TIUP at 36wk.   Patient is stable and doing well post-operatively.

## 2019-12-21 NOTE — PROVIDER CONTACT NOTE (OTHER) - BACKGROUND
Patient delivered twins via C/S. Gestational hypertension this pregnancy. Hx of lymphedema, and preeclampsia with previous pregnancy.

## 2019-12-21 NOTE — PROGRESS NOTE ADULT - ATTENDING COMMENTS
Patient seen and examined by me.  Agree with above resident note.
I have personally seen, examined and participated in the care of this patient. I have reviewed all pertinent clinical information and agree with the above note.
OB attending Note    Agree with above. 30yo now POD2 from 2'CS for DCDA twins at 36+ weeks for IUGR with poor interval growth. Hx of cHTN with BPs well controlled on labetalol 200mg BID. PEC labs wnl, asymptomatic. Noted to have significant LE edema L>R, dopplers wnl 12/19. Today reports that pain is well controlled on PO meds, tolerating PO, passing flatus. Edema is stable still L>R.    PE  Vital Signs Last 24 Hrs  T(C): 36.9 (20 Dec 2019 09:04), Max: 36.9 (20 Dec 2019 06:21)  T(F): 98.5 (20 Dec 2019 09:04), Max: 98.5 (20 Dec 2019 06:21)  HR: 73 (20 Dec 2019 09:04) (69 - 76)  BP: 122/78 (20 Dec 2019 09:04) (108/75 - 131/82)  BP(mean): --  RR: 18 (20 Dec 2019 09:04) (18 - 18)  SpO2: 96% (20 Dec 2019 09:04) (96% - 98%)    Gen: nad  Abd: soft NT, incision c/d/i, dermabond in place    LABS                        8.3    15.94 )-----------( 182      ( 19 Dec 2019 05:13 )             24.8     12-19    136  |  103  |  15  ----------------------------<  101<H>  4.2   |  21<L>  |  0.95    Ca    8.8      19 Dec 2019 05:13    TPro  4.7<L>  /  Alb  2.5<L>  /  TBili  0.1<L>  /  DBili  x   /  AST  19  /  ALT  14  /  AlkPhos  103  12-19    Rh pos, RPR NR, rubella immune    Plan:   - labs reviewed, BPs reviewed continue labetalol 200mg BID  - advised ambulating and leg elevation for LE edema  - routine PO care    Lorena Tellez MD

## 2019-12-23 DIAGNOSIS — O09.93 SUPERVISION OF HIGH RISK PREGNANCY, UNSPECIFIED, THIRD TRIMESTER: ICD-10-CM

## 2019-12-24 ENCOUNTER — APPOINTMENT (OUTPATIENT)
Dept: ANTEPARTUM | Facility: CLINIC | Age: 29
End: 2019-12-24

## 2019-12-24 DIAGNOSIS — O30.043 TWIN PREGNANCY, DICHORIONIC/DIAMNIOTIC, THIRD TRIMESTER: ICD-10-CM

## 2019-12-24 DIAGNOSIS — Z36.4 ENCOUNTER FOR ANTENATAL SCREENING FOR FETAL GROWTH RETARDATION: ICD-10-CM

## 2019-12-24 DIAGNOSIS — O10.013 PRE-EXISTING ESSENTIAL HYPERTENSION COMPLICATING PREGNANCY, THIRD TRIMESTER: ICD-10-CM

## 2019-12-24 DIAGNOSIS — O99.213 OBESITY COMPLICATING PREGNANCY, THIRD TRIMESTER: ICD-10-CM

## 2019-12-24 RX ORDER — IBUPROFEN 600 MG/1
600 TABLET, FILM COATED ORAL
Qty: 30 | Refills: 0 | Status: COMPLETED | COMMUNITY
Start: 2019-12-24 | End: 2020-01-21

## 2019-12-26 ENCOUNTER — INPATIENT (INPATIENT)
Facility: HOSPITAL | Age: 29
LOS: 1 days | Discharge: HOME | DRG: 776 | End: 2019-12-28
Attending: OBSTETRICS & GYNECOLOGY | Admitting: OBSTETRICS & GYNECOLOGY
Payer: COMMERCIAL

## 2019-12-26 VITALS
OXYGEN SATURATION: 100 % | DIASTOLIC BLOOD PRESSURE: 82 MMHG | HEART RATE: 70 BPM | SYSTOLIC BLOOD PRESSURE: 131 MMHG | TEMPERATURE: 98 F | RESPIRATION RATE: 16 BRPM

## 2019-12-26 DIAGNOSIS — O14.10 SEVERE PRE-ECLAMPSIA, UNSPECIFIED TRIMESTER: ICD-10-CM

## 2019-12-26 DIAGNOSIS — Z34.80 ENCOUNTER FOR SUPERVISION OF OTHER NORMAL PREGNANCY, UNSPECIFIED TRIMESTER: ICD-10-CM

## 2019-12-26 LAB
ALBUMIN SERPL ELPH-MCNC: 3.5 G/DL — SIGNIFICANT CHANGE UP (ref 3.3–5)
ALP SERPL-CCNC: 119 U/L — SIGNIFICANT CHANGE UP (ref 40–120)
ALT FLD-CCNC: 19 U/L — SIGNIFICANT CHANGE UP (ref 10–45)
ANION GAP SERPL CALC-SCNC: 14 MMOL/L — SIGNIFICANT CHANGE UP (ref 5–17)
APPEARANCE UR: CLEAR — SIGNIFICANT CHANGE UP
APTT BLD: 28.1 SEC — SIGNIFICANT CHANGE UP (ref 27.5–36.3)
AST SERPL-CCNC: 16 U/L — SIGNIFICANT CHANGE UP (ref 10–40)
BACTERIA # UR AUTO: NEGATIVE — SIGNIFICANT CHANGE UP
BASOPHILS # BLD AUTO: 0.03 K/UL — SIGNIFICANT CHANGE UP (ref 0–0.2)
BASOPHILS NFR BLD AUTO: 0.2 % — SIGNIFICANT CHANGE UP (ref 0–2)
BILIRUB SERPL-MCNC: 0.4 MG/DL — SIGNIFICANT CHANGE UP (ref 0.2–1.2)
BILIRUB UR-MCNC: NEGATIVE — SIGNIFICANT CHANGE UP
BUN SERPL-MCNC: 6 MG/DL — LOW (ref 7–23)
CALCIUM SERPL-MCNC: 9.5 MG/DL — SIGNIFICANT CHANGE UP (ref 8.4–10.5)
CHLORIDE SERPL-SCNC: 102 MMOL/L — SIGNIFICANT CHANGE UP (ref 96–108)
CO2 SERPL-SCNC: 23 MMOL/L — SIGNIFICANT CHANGE UP (ref 22–31)
COLOR SPEC: SIGNIFICANT CHANGE UP
CREAT ?TM UR-MCNC: 38 MG/DL — SIGNIFICANT CHANGE UP
CREAT SERPL-MCNC: 0.78 MG/DL — SIGNIFICANT CHANGE UP (ref 0.5–1.3)
DIFF PNL FLD: ABNORMAL
EOSINOPHIL # BLD AUTO: 0.43 K/UL — SIGNIFICANT CHANGE UP (ref 0–0.5)
EOSINOPHIL NFR BLD AUTO: 3.3 % — SIGNIFICANT CHANGE UP (ref 0–6)
EPI CELLS # UR: 2 /HPF — SIGNIFICANT CHANGE UP
FIBRINOGEN PPP-MCNC: 920 MG/DL — SIGNIFICANT CHANGE UP (ref 350–510)
GLUCOSE SERPL-MCNC: 93 MG/DL — SIGNIFICANT CHANGE UP (ref 70–99)
GLUCOSE UR QL: NEGATIVE — SIGNIFICANT CHANGE UP
HCT VFR BLD CALC: 29.9 % — LOW (ref 34.5–45)
HGB BLD-MCNC: 9.7 G/DL — LOW (ref 11.5–15.5)
HYALINE CASTS # UR AUTO: 0 /LPF — SIGNIFICANT CHANGE UP (ref 0–2)
IMM GRANULOCYTES NFR BLD AUTO: 0.4 % — SIGNIFICANT CHANGE UP (ref 0–1.5)
INR BLD: 1.09 RATIO — SIGNIFICANT CHANGE UP (ref 0.88–1.16)
KETONES UR-MCNC: NEGATIVE — SIGNIFICANT CHANGE UP
LDH SERPL L TO P-CCNC: 239 U/L — SIGNIFICANT CHANGE UP (ref 50–242)
LEUKOCYTE ESTERASE UR-ACNC: ABNORMAL
LYMPHOCYTES # BLD AUTO: 17.9 % — SIGNIFICANT CHANGE UP (ref 13–44)
LYMPHOCYTES # BLD AUTO: 2.32 K/UL — SIGNIFICANT CHANGE UP (ref 1–3.3)
MCHC RBC-ENTMCNC: 29.9 PG — SIGNIFICANT CHANGE UP (ref 27–34)
MCHC RBC-ENTMCNC: 32.4 GM/DL — SIGNIFICANT CHANGE UP (ref 32–36)
MCV RBC AUTO: 92.3 FL — SIGNIFICANT CHANGE UP (ref 80–100)
MONOCYTES # BLD AUTO: 0.87 K/UL — SIGNIFICANT CHANGE UP (ref 0–0.9)
MONOCYTES NFR BLD AUTO: 6.7 % — SIGNIFICANT CHANGE UP (ref 2–14)
NEUTROPHILS # BLD AUTO: 9.24 K/UL — HIGH (ref 1.8–7.4)
NEUTROPHILS NFR BLD AUTO: 71.5 % — SIGNIFICANT CHANGE UP (ref 43–77)
NITRITE UR-MCNC: NEGATIVE — SIGNIFICANT CHANGE UP
NRBC # BLD: 0 /100 WBCS — SIGNIFICANT CHANGE UP (ref 0–0)
PH UR: 7 — SIGNIFICANT CHANGE UP (ref 5–8)
PLATELET # BLD AUTO: 404 K/UL — HIGH (ref 150–400)
POTASSIUM SERPL-MCNC: 4 MMOL/L — SIGNIFICANT CHANGE UP (ref 3.5–5.3)
POTASSIUM SERPL-SCNC: 4 MMOL/L — SIGNIFICANT CHANGE UP (ref 3.5–5.3)
PROT ?TM UR-MCNC: 6 MG/DL — SIGNIFICANT CHANGE UP (ref 0–12)
PROT SERPL-MCNC: 6.7 G/DL — SIGNIFICANT CHANGE UP (ref 6–8.3)
PROT UR-MCNC: NEGATIVE — SIGNIFICANT CHANGE UP
PROT/CREAT UR-RTO: 0.2 RATIO — SIGNIFICANT CHANGE UP (ref 0–0.2)
PROTHROM AB SERPL-ACNC: 12.6 SEC — SIGNIFICANT CHANGE UP (ref 10–12.9)
RBC # BLD: 3.24 M/UL — LOW (ref 3.8–5.2)
RBC # FLD: 13.8 % — SIGNIFICANT CHANGE UP (ref 10.3–14.5)
RBC CASTS # UR COMP ASSIST: 3 /HPF — SIGNIFICANT CHANGE UP (ref 0–4)
SODIUM SERPL-SCNC: 139 MMOL/L — SIGNIFICANT CHANGE UP (ref 135–145)
SP GR SPEC: 1 — LOW (ref 1.01–1.02)
URATE SERPL-MCNC: 5.2 MG/DL — SIGNIFICANT CHANGE UP (ref 2.5–7)
UROBILINOGEN FLD QL: NEGATIVE — SIGNIFICANT CHANGE UP
WBC # BLD: 12.94 K/UL — HIGH (ref 3.8–10.5)
WBC # FLD AUTO: 12.94 K/UL — HIGH (ref 3.8–10.5)
WBC UR QL: 3 /HPF — SIGNIFICANT CHANGE UP (ref 0–5)

## 2019-12-26 PROCEDURE — 74177 CT ABD & PELVIS W/CONTRAST: CPT | Mod: 26

## 2019-12-26 RX ORDER — MAGNESIUM SULFATE 500 MG/ML
2 VIAL (ML) INJECTION
Qty: 40 | Refills: 0 | Status: DISCONTINUED | OUTPATIENT
Start: 2019-12-26 | End: 2019-12-27

## 2019-12-26 RX ORDER — HEPARIN SODIUM 5000 [USP'U]/ML
5000 INJECTION INTRAVENOUS; SUBCUTANEOUS EVERY 12 HOURS
Refills: 0 | Status: DISCONTINUED | OUTPATIENT
Start: 2019-12-26 | End: 2019-12-27

## 2019-12-26 RX ORDER — LABETALOL HCL 100 MG
100 TABLET ORAL ONCE
Refills: 0 | Status: COMPLETED | OUTPATIENT
Start: 2019-12-26 | End: 2019-12-26

## 2019-12-26 RX ORDER — LABETALOL HCL 100 MG
20 TABLET ORAL ONCE
Refills: 0 | Status: COMPLETED | OUTPATIENT
Start: 2019-12-26 | End: 2019-12-26

## 2019-12-26 RX ORDER — LABETALOL HCL 100 MG
300 TABLET ORAL ONCE
Refills: 0 | Status: COMPLETED | OUTPATIENT
Start: 2019-12-26 | End: 2019-12-26

## 2019-12-26 RX ORDER — ACETAMINOPHEN 500 MG
975 TABLET ORAL ONCE
Refills: 0 | Status: COMPLETED | OUTPATIENT
Start: 2019-12-26 | End: 2019-12-26

## 2019-12-26 RX ORDER — MAGNESIUM SULFATE 500 MG/ML
4 VIAL (ML) INJECTION ONCE
Refills: 0 | Status: COMPLETED | OUTPATIENT
Start: 2019-12-26 | End: 2019-12-26

## 2019-12-26 RX ORDER — LABETALOL HCL 100 MG
400 TABLET ORAL EVERY 8 HOURS
Refills: 0 | Status: DISCONTINUED | OUTPATIENT
Start: 2019-12-26 | End: 2019-12-28

## 2019-12-26 RX ADMIN — Medication 50 GM/HR: at 23:06

## 2019-12-26 RX ADMIN — Medication 100 MILLIGRAM(S): at 19:41

## 2019-12-26 RX ADMIN — Medication 200 GRAM(S): at 21:35

## 2019-12-26 RX ADMIN — Medication 20 MILLIGRAM(S): at 21:27

## 2019-12-26 RX ADMIN — Medication 975 MILLIGRAM(S): at 17:33

## 2019-12-26 RX ADMIN — Medication 300 MILLIGRAM(S): at 17:34

## 2019-12-26 NOTE — CHART NOTE - NSCHARTNOTEFT_GEN_A_CORE
R3 Triage Note    29y  POD#8 status post rLTCS who presents with intermittent headaches 4/10 that resolve with tylenol x3 days. She notes that last night, before she took her 200mg of labetalol her blood pressures was 167/83. An hour after she took her blood pressure medication her BP was down to 140s/80s. She notes that this happened again this morning. She has been on 200mg of labetalol BID since her last delivery in . She denies any blurry vision, spots in her vision and right upper quadrant pain. She denies nausea, vomiting, chest pain, shortness of breath, constipation, diarrhea, and dysuria.      OB/GYN History: C/S@32wks '18 for severe pre-eclampsia, repeat C/S for TIUP, denies fibroids, ovarian cysts, STDs and abnormal paps  PAST MEDICAL & SURGICAL HISTORY:  cHTN  chronic LLE edema with multiple normal dopplers without evidence of DVTs    Allergies    No Known Allergies    Intolerances      MEDICATIONS  (STANDING):  acetaminophen   Tablet .. 975 milliGRAM(s) Oral once  labetalol 300 milliGRAM(s) Oral once    MEDICATIONS  (PRN):    SOCIAL HISTORY: denies tobacco, alcohol and illicit drug use     Vital Signs Last 24 Hrs  T(C): 36.7 (26 Dec 2019 16:05), Max: 36.7 (26 Dec 2019 16:05)  T(F): 98.1 (26 Dec 2019 16:05), Max: 98.1 (26 Dec 2019 16:05)  HR: 65 (26 Dec 2019 16:55) (60 - 75)  BP: 159/- (26 Dec 2019 16:55) (131/82 - 159/-)  BP(mean): --  RR: 16 (26 Dec 2019 16:55) (15 - 16)  SpO2: 100% (26 Dec 2019 16:55) (98% - 100%)    PHYSICAL EXAM:      Constitutional: alert and oriented x 3    Respiratory: clear    Cardiovascular: regular rate and rhythm    Gastrointestinal: soft, appropriately tender, + bowel sounds. No hepatosplenomegaly, no palpable masses    Genitourinary: normal lochia    Extremities: b/l LE edema L>R, 2+ pitting edema LLE, RLE with 1+ non pitting edema    Labs:    Blood Type: O Positive    A/P: 29y  POD#8 status post rLTCS who presents with intermittent headaches 4/10 that resolve with tylenol x3 days and elevated BPs prior to taking her blood pressure medication.  -will send HELLP labs,  -will give 975mg PO tylenol  -will give 300mg PO labetalol and change her dose to 300mg TID  -if pt's headache resolves and HELLP labs are normal, will likely discharge home to follow up in the clinic for blood pressure checks    Discussed with Dr. Rock PGY4, Dr. Ruggiero [facility], and Dr. Ifeanyi Lorenzo MD PGY3

## 2019-12-27 ENCOUNTER — APPOINTMENT (OUTPATIENT)
Dept: ANTEPARTUM | Facility: CLINIC | Age: 29
End: 2019-12-27

## 2019-12-27 DIAGNOSIS — T14.8XXA OTHER INJURY OF UNSPECIFIED BODY REGION, INITIAL ENCOUNTER: ICD-10-CM

## 2019-12-27 DIAGNOSIS — Z98.891 HISTORY OF UTERINE SCAR FROM PREVIOUS SURGERY: Chronic | ICD-10-CM

## 2019-12-27 DIAGNOSIS — O14.90 UNSPECIFIED PRE-ECLAMPSIA, UNSPECIFIED TRIMESTER: ICD-10-CM

## 2019-12-27 LAB
ALBUMIN SERPL ELPH-MCNC: 3.1 G/DL — LOW (ref 3.3–5)
ALBUMIN SERPL ELPH-MCNC: 3.2 G/DL — LOW (ref 3.3–5)
ALP SERPL-CCNC: 110 U/L — SIGNIFICANT CHANGE UP (ref 40–120)
ALP SERPL-CCNC: 112 U/L — SIGNIFICANT CHANGE UP (ref 40–120)
ALT FLD-CCNC: 13 U/L — SIGNIFICANT CHANGE UP (ref 10–45)
ALT FLD-CCNC: 14 U/L — SIGNIFICANT CHANGE UP (ref 10–45)
ANION GAP SERPL CALC-SCNC: 13 MMOL/L — SIGNIFICANT CHANGE UP (ref 5–17)
ANION GAP SERPL CALC-SCNC: 14 MMOL/L — SIGNIFICANT CHANGE UP (ref 5–17)
AST SERPL-CCNC: 11 U/L — SIGNIFICANT CHANGE UP (ref 10–40)
AST SERPL-CCNC: 7 U/L — LOW (ref 10–40)
BASOPHILS # BLD AUTO: 0.02 K/UL — SIGNIFICANT CHANGE UP (ref 0–0.2)
BASOPHILS # BLD AUTO: 0.04 K/UL — SIGNIFICANT CHANGE UP (ref 0–0.2)
BASOPHILS NFR BLD AUTO: 0.2 % — SIGNIFICANT CHANGE UP (ref 0–2)
BASOPHILS NFR BLD AUTO: 0.4 % — SIGNIFICANT CHANGE UP (ref 0–2)
BILIRUB SERPL-MCNC: 0.2 MG/DL — SIGNIFICANT CHANGE UP (ref 0.2–1.2)
BILIRUB SERPL-MCNC: 0.3 MG/DL — SIGNIFICANT CHANGE UP (ref 0.2–1.2)
BUN SERPL-MCNC: 6 MG/DL — LOW (ref 7–23)
BUN SERPL-MCNC: 7 MG/DL — SIGNIFICANT CHANGE UP (ref 7–23)
CALCIUM SERPL-MCNC: 7.8 MG/DL — LOW (ref 8.4–10.5)
CALCIUM SERPL-MCNC: 8.8 MG/DL — SIGNIFICANT CHANGE UP (ref 8.4–10.5)
CHLORIDE SERPL-SCNC: 102 MMOL/L — SIGNIFICANT CHANGE UP (ref 96–108)
CHLORIDE SERPL-SCNC: 104 MMOL/L — SIGNIFICANT CHANGE UP (ref 96–108)
CO2 SERPL-SCNC: 22 MMOL/L — SIGNIFICANT CHANGE UP (ref 22–31)
CO2 SERPL-SCNC: 23 MMOL/L — SIGNIFICANT CHANGE UP (ref 22–31)
CREAT SERPL-MCNC: 0.84 MG/DL — SIGNIFICANT CHANGE UP (ref 0.5–1.3)
CREAT SERPL-MCNC: 0.85 MG/DL — SIGNIFICANT CHANGE UP (ref 0.5–1.3)
EOSINOPHIL # BLD AUTO: 0.43 K/UL — SIGNIFICANT CHANGE UP (ref 0–0.5)
EOSINOPHIL # BLD AUTO: 0.51 K/UL — HIGH (ref 0–0.5)
EOSINOPHIL NFR BLD AUTO: 4.4 % — SIGNIFICANT CHANGE UP (ref 0–6)
EOSINOPHIL NFR BLD AUTO: 4.8 % — SIGNIFICANT CHANGE UP (ref 0–6)
FIBRINOGEN PPP-MCNC: 840 MG/DL — HIGH (ref 350–510)
FIBRINOGEN PPP-MCNC: 876 MG/DL — HIGH (ref 350–510)
GLUCOSE SERPL-MCNC: 105 MG/DL — HIGH (ref 70–99)
GLUCOSE SERPL-MCNC: 92 MG/DL — SIGNIFICANT CHANGE UP (ref 70–99)
HCT VFR BLD CALC: 29.2 % — LOW (ref 34.5–45)
HCT VFR BLD CALC: 29.3 % — LOW (ref 34.5–45)
HGB BLD-MCNC: 9.2 G/DL — LOW (ref 11.5–15.5)
HGB BLD-MCNC: 9.3 G/DL — LOW (ref 11.5–15.5)
IMM GRANULOCYTES NFR BLD AUTO: 0.4 % — SIGNIFICANT CHANGE UP (ref 0–1.5)
IMM GRANULOCYTES NFR BLD AUTO: 1.4 % — SIGNIFICANT CHANGE UP (ref 0–1.5)
INR BLD: 1.02 RATIO — SIGNIFICANT CHANGE UP (ref 0.88–1.16)
INR BLD: 1.03 RATIO — SIGNIFICANT CHANGE UP (ref 0.88–1.16)
LDH SERPL L TO P-CCNC: 217 U/L — SIGNIFICANT CHANGE UP (ref 50–242)
LDH SERPL L TO P-CCNC: 220 U/L — SIGNIFICANT CHANGE UP (ref 50–242)
LYMPHOCYTES # BLD AUTO: 1.73 K/UL — SIGNIFICANT CHANGE UP (ref 1–3.3)
LYMPHOCYTES # BLD AUTO: 17.6 % — SIGNIFICANT CHANGE UP (ref 13–44)
LYMPHOCYTES # BLD AUTO: 2.25 K/UL — SIGNIFICANT CHANGE UP (ref 1–3.3)
LYMPHOCYTES # BLD AUTO: 21 % — SIGNIFICANT CHANGE UP (ref 13–44)
MAGNESIUM SERPL-MCNC: 4 MG/DL — HIGH (ref 1.6–2.6)
MAGNESIUM SERPL-MCNC: 5 MG/DL — HIGH (ref 1.6–2.6)
MAGNESIUM SERPL-MCNC: 5.3 MG/DL — HIGH (ref 1.6–2.6)
MCHC RBC-ENTMCNC: 29.4 PG — SIGNIFICANT CHANGE UP (ref 27–34)
MCHC RBC-ENTMCNC: 29.7 PG — SIGNIFICANT CHANGE UP (ref 27–34)
MCHC RBC-ENTMCNC: 31.4 GM/DL — LOW (ref 32–36)
MCHC RBC-ENTMCNC: 31.8 GM/DL — LOW (ref 32–36)
MCV RBC AUTO: 93.3 FL — SIGNIFICANT CHANGE UP (ref 80–100)
MCV RBC AUTO: 93.6 FL — SIGNIFICANT CHANGE UP (ref 80–100)
MONOCYTES # BLD AUTO: 0.63 K/UL — SIGNIFICANT CHANGE UP (ref 0–0.9)
MONOCYTES # BLD AUTO: 0.88 K/UL — SIGNIFICANT CHANGE UP (ref 0–0.9)
MONOCYTES NFR BLD AUTO: 6.4 % — SIGNIFICANT CHANGE UP (ref 2–14)
MONOCYTES NFR BLD AUTO: 8.2 % — SIGNIFICANT CHANGE UP (ref 2–14)
NEUTROPHILS # BLD AUTO: 6.86 K/UL — SIGNIFICANT CHANGE UP (ref 1.8–7.4)
NEUTROPHILS # BLD AUTO: 7 K/UL — SIGNIFICANT CHANGE UP (ref 1.8–7.4)
NEUTROPHILS NFR BLD AUTO: 65.4 % — SIGNIFICANT CHANGE UP (ref 43–77)
NEUTROPHILS NFR BLD AUTO: 69.8 % — SIGNIFICANT CHANGE UP (ref 43–77)
NRBC # BLD: 0 /100 WBCS — SIGNIFICANT CHANGE UP (ref 0–0)
NRBC # BLD: 0 /100 WBCS — SIGNIFICANT CHANGE UP (ref 0–0)
PLATELET # BLD AUTO: 399 K/UL — SIGNIFICANT CHANGE UP (ref 150–400)
PLATELET # BLD AUTO: 422 K/UL — HIGH (ref 150–400)
POTASSIUM SERPL-MCNC: 3.7 MMOL/L — SIGNIFICANT CHANGE UP (ref 3.5–5.3)
POTASSIUM SERPL-MCNC: 4 MMOL/L — SIGNIFICANT CHANGE UP (ref 3.5–5.3)
POTASSIUM SERPL-SCNC: 3.7 MMOL/L — SIGNIFICANT CHANGE UP (ref 3.5–5.3)
POTASSIUM SERPL-SCNC: 4 MMOL/L — SIGNIFICANT CHANGE UP (ref 3.5–5.3)
PROT SERPL-MCNC: 6.1 G/DL — SIGNIFICANT CHANGE UP (ref 6–8.3)
PROT SERPL-MCNC: 6.2 G/DL — SIGNIFICANT CHANGE UP (ref 6–8.3)
PROTHROM AB SERPL-ACNC: 11.6 SEC — SIGNIFICANT CHANGE UP (ref 10–12.9)
PROTHROM AB SERPL-ACNC: 11.7 SEC — SIGNIFICANT CHANGE UP (ref 10–12.9)
RBC # BLD: 3.13 M/UL — LOW (ref 3.8–5.2)
RBC # BLD: 3.13 M/UL — LOW (ref 3.8–5.2)
RBC # FLD: 14 % — SIGNIFICANT CHANGE UP (ref 10.3–14.5)
RBC # FLD: 14 % — SIGNIFICANT CHANGE UP (ref 10.3–14.5)
SODIUM SERPL-SCNC: 139 MMOL/L — SIGNIFICANT CHANGE UP (ref 135–145)
SODIUM SERPL-SCNC: 139 MMOL/L — SIGNIFICANT CHANGE UP (ref 135–145)
URATE SERPL-MCNC: 5.4 MG/DL — SIGNIFICANT CHANGE UP (ref 2.5–7)
URATE SERPL-MCNC: 5.5 MG/DL — SIGNIFICANT CHANGE UP (ref 2.5–7)
WBC # BLD: 10.7 K/UL — HIGH (ref 3.8–10.5)
WBC # BLD: 9.83 K/UL — SIGNIFICANT CHANGE UP (ref 3.8–10.5)
WBC # FLD AUTO: 10.7 K/UL — HIGH (ref 3.8–10.5)
WBC # FLD AUTO: 9.83 K/UL — SIGNIFICANT CHANGE UP (ref 3.8–10.5)

## 2019-12-27 RX ORDER — ACETAMINOPHEN 500 MG
975 TABLET ORAL EVERY 6 HOURS
Refills: 0 | Status: DISCONTINUED | OUTPATIENT
Start: 2019-12-27 | End: 2019-12-28

## 2019-12-27 RX ORDER — HEPARIN SODIUM 5000 [USP'U]/ML
5000 INJECTION INTRAVENOUS; SUBCUTANEOUS EVERY 12 HOURS
Refills: 0 | Status: DISCONTINUED | OUTPATIENT
Start: 2019-12-27 | End: 2019-12-28

## 2019-12-27 RX ORDER — METOCLOPRAMIDE HCL 10 MG
10 TABLET ORAL ONCE
Refills: 0 | Status: COMPLETED | OUTPATIENT
Start: 2019-12-27 | End: 2019-12-27

## 2019-12-27 RX ORDER — IBUPROFEN 200 MG
600 TABLET ORAL EVERY 6 HOURS
Refills: 0 | Status: DISCONTINUED | OUTPATIENT
Start: 2019-12-27 | End: 2019-12-28

## 2019-12-27 RX ORDER — MAGNESIUM SULFATE 500 MG/ML
2 VIAL (ML) INJECTION
Qty: 40 | Refills: 0 | Status: DISCONTINUED | OUTPATIENT
Start: 2019-12-27 | End: 2019-12-28

## 2019-12-27 RX ADMIN — HEPARIN SODIUM 5000 UNIT(S): 5000 INJECTION INTRAVENOUS; SUBCUTANEOUS at 18:38

## 2019-12-27 RX ADMIN — Medication 975 MILLIGRAM(S): at 05:44

## 2019-12-27 RX ADMIN — Medication 600 MILLIGRAM(S): at 23:52

## 2019-12-27 RX ADMIN — Medication 1 TABLET(S): at 18:38

## 2019-12-27 RX ADMIN — Medication 600 MILLIGRAM(S): at 11:51

## 2019-12-27 RX ADMIN — Medication 1 TABLET(S): at 11:19

## 2019-12-27 RX ADMIN — Medication 1 TABLET(S): at 05:44

## 2019-12-27 RX ADMIN — Medication 975 MILLIGRAM(S): at 06:18

## 2019-12-27 RX ADMIN — Medication 400 MILLIGRAM(S): at 11:19

## 2019-12-27 RX ADMIN — Medication 600 MILLIGRAM(S): at 01:23

## 2019-12-27 RX ADMIN — Medication 600 MILLIGRAM(S): at 00:25

## 2019-12-27 RX ADMIN — Medication 400 MILLIGRAM(S): at 18:38

## 2019-12-27 RX ADMIN — Medication 400 MILLIGRAM(S): at 01:40

## 2019-12-27 RX ADMIN — Medication 600 MILLIGRAM(S): at 11:19

## 2019-12-27 RX ADMIN — Medication 10 MILLIGRAM(S): at 15:49

## 2019-12-27 RX ADMIN — HEPARIN SODIUM 5000 UNIT(S): 5000 INJECTION INTRAVENOUS; SUBCUTANEOUS at 05:44

## 2019-12-27 NOTE — H&P ADULT - PROBLEM SELECTOR PLAN 2
-f/u CT abdomen pelvis to r/o incisional abscess vs. intrabdominal collection  -Incision cleaned with normal saline. Counseled pt re: wound care. Wet to dry dressing placed  -F/u wound cx (12/26)

## 2019-12-27 NOTE — H&P ADULT - ATTENDING COMMENTS
Attending Note     Patient assessed with resident. Patient turned over by day team with severely elevated BP postpartum, previously on Labetalol 200mg PO BID requiring Labetalol IVP and increase to Labetalol 400mg PO TID. Patient with complaints of intermittent frontal headache, relieved with Tylenol and increased lower abdominal pain with "bulge". Denies drainage from incision but does not often look at it.   On exam, blood pressures liable 130-160s/80-100s P 79   Abd: no RUQ/epigastric tenderness, soft/obese  Incision: + foul smelling with crusted discharge around edges + erythema and induration noted on superior aspect of incision + tenderness   right corner + defect 1cm noted and probed- fascia intact with 1cm depth of SubQ space--culture collected  Ext: right calf + warmth/erythema and tender; left calf-no calf tenderness   Previously -- doppler negative on 12/19   Labs reviewed   CT scan reviewed PRELIM no intra-abdominal collection   Plan   -admission for pp pre-eclampsia with magnesium sulfate therapy   -continue Labetalol 400mg TID   -cultures for abdominal incision   -continue to monitor vitals   -monitor labs   -Augmentin for wound cellulitis   -DVT PPx     ELIZABETH Martin

## 2019-12-27 NOTE — H&P ADULT - NSHPPHYSICALEXAM_GEN_ALL_CORE
PHYSICAL EXAM:  Constitutional: alert and oriented x 3  Respiratory: clear  Cardiovascular: regular rate and rhythm  Gastrointestinal: soft, appropriately tender, + bowel sounds. No hepatosplenomegaly, no palpable masses  Genitourinary: normal lochia  Incision: indurated and erythematous superior to the incision with a 0.5 cm area of wound separation at the R angle of the incision. No active drainage seen, but wound separation was cultured. Wet to dry dressing placed.  Extremities: b/l LE edema L significantly L R, 2+ pitting edema LLE, RLE with 1+ non pitting edema. Unchanged from baseline lymphedema

## 2019-12-27 NOTE — H&P ADULT - HISTORY OF PRESENT ILLNESS
29y  POD#8 status post rLTCS who presents with intermittent headaches 4/10 that resolve with tylenol x3 days. She notes that last night, before she took her 200mg of labetalol her blood pressures was 167/83. An hour after she took her blood pressure medication her BP was down to 140s/80s. She notes that this happened again this morning. She has been on 200mg of labetalol BID since her last delivery in . She denies any blurry vision, spots in her vision and right upper quadrant pain. She denies nausea, vomiting, chest pain, shortness of breath, constipation, diarrhea, and dysuria.    In L&D Triage, PO labetalol was increased to 300 mg TID. Pt then had persistent severe range BPs  requiring IVP labetalol 20mg. Her PO labetalol was subsequently increased to 400 mg TID. Pt also c/o of incision pain. On exam, c/s scar incision was indurated and erythematous superior to the incision with a 0.5 cm area of wound separation at the R angle of the incision. No active drainage seen, but wound was cultured.      OB/GYN History: C/S@32wks '18 for severe pre-eclampsia, repeat C/S for TIUP, denies fibroids, ovarian cysts, STDs and abnormal paps  PAST MEDICAL & SURGICAL HISTORY:  cHTN  chronic LLE edema with multiple normal dopplers without evidence of DVTs    Allergies    No Known Allergies 29y  POD#8 status post rLTCS who presents with intermittent headaches 4/10 that resolve with tylenol x3 days. She notes that last night, before she took her 200mg of labetalol her blood pressures was 167/83. An hour after she took her blood pressure medication her BP was down to 140s/80s. She notes that this happened again this morning. She has been on 200mg of labetalol BID since her last delivery in . She denies any blurry vision, spots in her vision and right upper quadrant pain. She denies nausea, vomiting, chest pain, shortness of breath, constipation, diarrhea, and dysuria.    In L&D Triage, PO labetalol was increased to 300 mg TID. Pt then had persistent severe range BPs  requiring IVP labetalol 20mg. Her PO labetalol was subsequently increased to 400 mg TID. Pt also c/o of incision pain. On exam, c/s scar incision was indurated and erythematous superior to the incision with a 0.5 cm area of wound separation at the R angle of the incision. No active drainage seen, but wound separation was cultured. Pt remains afebrile but c/o occasional chills.       OB/GYN History: C/S@32wks '18 for severe pre-eclampsia, repeat C/S for TIUP, denies fibroids, ovarian cysts, STDs and abnormal paps  PAST MEDICAL & SURGICAL HISTORY:  cHTN  chronic LLE edema with multiple normal dopplers without evidence of DVTs    Allergies    No Known Allergies

## 2019-12-27 NOTE — PROGRESS NOTE ADULT - ATTENDING COMMENTS
Pt w no complaints.  Denies abd pain  ///78  Inc- erythema w superficial separation on right side  Cont MgSO4  F/U final CT  Cont augmentin

## 2019-12-27 NOTE — H&P ADULT - NSHPLABSRESULTS_GEN_ALL_CORE
Vital Signs Last 24 Hrs  T(C): 36.8 (27 Dec 2019 01:40), Max: 36.9 (26 Dec 2019 23:45)  T(F): 98.2 (27 Dec 2019 01:40), Max: 98.4 (26 Dec 2019 23:45)  HR: 81 (27 Dec 2019 01:40) (58 - 88)  BP: 120/83 (27 Dec 2019 01:40) (120/83 - 171/97)  BP(mean): 122 (26 Dec 2019 23:45) (115 - 122)  RR: 18 (27 Dec 2019 01:40) (14 - 18)  SpO2: 98% (27 Dec 2019 01:40) (96% - 100%)    I&O's Detail    26 Dec 2019 07:01  -  27 Dec 2019 03:14  --------------------------------------------------------  IN:    Oral Fluid: 591 mL  Total IN: 591 mL    OUT:    Voided: 1350 mL  Total OUT: 1350 mL    Total NET: -759 mL                                9.7    12.94 )-----------( 404      ( 26 Dec 2019 18:24 )             29.9           139  |  102  |  6<L>  ----------------------------<  93  4.0   |  23  |  0.78    Ca    9.5      26 Dec 2019 18:24  Mg     4.0         TPro  6.7  /  Alb  3.5  /  TBili  0.4  /  DBili  x   /  AST  16  /  ALT  19  /  AlkPhos  119        CAPILLARY BLOOD GLUCOSE          LIVER FUNCTIONS - ( 26 Dec 2019 18:24 )  Alb: 3.5 g/dL / Pro: 6.7 g/dL / ALK PHOS: 119 U/L / ALT: 19 U/L / AST: 16 U/L / GGT: x             PT/INR - ( 26 Dec 2019 18:24 )   PT: 12.6 sec;   INR: 1.09 ratio         PTT - ( 26 Dec 2019 18:24 )  PTT:28.1 sec    Urinalysis Basic - ( 26 Dec 2019 19:06 )    Color: Light Yellow / Appearance: Clear / S.005 / pH: x  Gluc: x / Ketone: Negative  / Bili: Negative / Urobili: Negative   Blood: x / Protein: Negative / Nitrite: Negative   Leuk Esterase: Moderate / RBC: 3 /hpf / WBC 3 /HPF   Sq Epi: x / Non Sq Epi: 2 /hpf / Bacteria: Negative    RADIOLOGY:  CT A/P pending to r/o incisional/intrabdominal collection

## 2019-12-27 NOTE — H&P ADULT - PROBLEM SELECTOR PLAN 1
-s/p lab 20 IVP 12/26  -MgSO4 for seizure ppx. Discontinue 11pm 12/27  -Increased labetalol to 400 mg TID  -continue monitor BPs

## 2019-12-27 NOTE — H&P ADULT - ASSESSMENT
29y  POD#8 status post rLTCS readmitted for severe siPEC requiring lab 20 IVP. Pt also with wound infection, likely cellulitis. R/o abscess or intrabdominal infection.

## 2019-12-28 ENCOUNTER — TRANSCRIPTION ENCOUNTER (OUTPATIENT)
Age: 29
End: 2019-12-28

## 2019-12-28 ENCOUNTER — INPATIENT (INPATIENT)
Facility: HOSPITAL | Age: 29
LOS: 0 days | Discharge: HOME | DRG: 776 | End: 2019-12-29
Attending: OBSTETRICS & GYNECOLOGY | Admitting: OBSTETRICS & GYNECOLOGY
Payer: COMMERCIAL

## 2019-12-28 VITALS
OXYGEN SATURATION: 99 % | HEART RATE: 67 BPM | WEIGHT: 293 LBS | RESPIRATION RATE: 64 BRPM | DIASTOLIC BLOOD PRESSURE: 113 MMHG | SYSTOLIC BLOOD PRESSURE: 166 MMHG | TEMPERATURE: 98 F | HEIGHT: 67 IN

## 2019-12-28 VITALS
DIASTOLIC BLOOD PRESSURE: 83 MMHG | TEMPERATURE: 98 F | RESPIRATION RATE: 18 BRPM | HEART RATE: 66 BPM | SYSTOLIC BLOOD PRESSURE: 130 MMHG | OXYGEN SATURATION: 99 %

## 2019-12-28 DIAGNOSIS — Z98.891 HISTORY OF UTERINE SCAR FROM PREVIOUS SURGERY: Chronic | ICD-10-CM

## 2019-12-28 LAB
-  AMIKACIN: SIGNIFICANT CHANGE UP
-  AMIKACIN: SIGNIFICANT CHANGE UP
-  AMOXICILLIN/CLAVULANIC ACID: SIGNIFICANT CHANGE UP
-  AMOXICILLIN/CLAVULANIC ACID: SIGNIFICANT CHANGE UP
-  AMPICILLIN/SULBACTAM: SIGNIFICANT CHANGE UP
-  AMPICILLIN/SULBACTAM: SIGNIFICANT CHANGE UP
-  AMPICILLIN: SIGNIFICANT CHANGE UP
-  AMPICILLIN: SIGNIFICANT CHANGE UP
-  AZTREONAM: SIGNIFICANT CHANGE UP
-  AZTREONAM: SIGNIFICANT CHANGE UP
-  CEFAZOLIN: SIGNIFICANT CHANGE UP
-  CEFAZOLIN: SIGNIFICANT CHANGE UP
-  CEFEPIME: SIGNIFICANT CHANGE UP
-  CEFEPIME: SIGNIFICANT CHANGE UP
-  CEFOXITIN: SIGNIFICANT CHANGE UP
-  CEFOXITIN: SIGNIFICANT CHANGE UP
-  CEFTRIAXONE: SIGNIFICANT CHANGE UP
-  CEFTRIAXONE: SIGNIFICANT CHANGE UP
-  CIPROFLOXACIN: SIGNIFICANT CHANGE UP
-  CIPROFLOXACIN: SIGNIFICANT CHANGE UP
-  ERTAPENEM: SIGNIFICANT CHANGE UP
-  ERTAPENEM: SIGNIFICANT CHANGE UP
-  GENTAMICIN: SIGNIFICANT CHANGE UP
-  GENTAMICIN: SIGNIFICANT CHANGE UP
-  IMIPENEM: SIGNIFICANT CHANGE UP
-  LEVOFLOXACIN: SIGNIFICANT CHANGE UP
-  LEVOFLOXACIN: SIGNIFICANT CHANGE UP
-  MEROPENEM: SIGNIFICANT CHANGE UP
-  MEROPENEM: SIGNIFICANT CHANGE UP
-  PIPERACILLIN/TAZOBACTAM: SIGNIFICANT CHANGE UP
-  PIPERACILLIN/TAZOBACTAM: SIGNIFICANT CHANGE UP
-  TOBRAMYCIN: SIGNIFICANT CHANGE UP
-  TOBRAMYCIN: SIGNIFICANT CHANGE UP
-  TRIMETHOPRIM/SULFAMETHOXAZOLE: SIGNIFICANT CHANGE UP
-  TRIMETHOPRIM/SULFAMETHOXAZOLE: SIGNIFICANT CHANGE UP
ALBUMIN SERPL ELPH-MCNC: 3.1 G/DL — LOW (ref 3.3–5)
ALP SERPL-CCNC: 111 U/L — SIGNIFICANT CHANGE UP (ref 40–120)
ALT FLD-CCNC: 14 U/L — SIGNIFICANT CHANGE UP (ref 10–45)
ANION GAP SERPL CALC-SCNC: 14 MMOL/L — SIGNIFICANT CHANGE UP (ref 5–17)
APTT BLD: 27.6 SEC — SIGNIFICANT CHANGE UP (ref 27.5–36.3)
AST SERPL-CCNC: 13 U/L — SIGNIFICANT CHANGE UP (ref 10–40)
BASOPHILS # BLD AUTO: 0.02 K/UL — SIGNIFICANT CHANGE UP (ref 0–0.2)
BASOPHILS NFR BLD AUTO: 0.2 % — SIGNIFICANT CHANGE UP (ref 0–2)
BILIRUB SERPL-MCNC: 0.2 MG/DL — SIGNIFICANT CHANGE UP (ref 0.2–1.2)
BUN SERPL-MCNC: 7 MG/DL — SIGNIFICANT CHANGE UP (ref 7–23)
CALCIUM SERPL-MCNC: 8.4 MG/DL — SIGNIFICANT CHANGE UP (ref 8.4–10.5)
CHLORIDE SERPL-SCNC: 102 MMOL/L — SIGNIFICANT CHANGE UP (ref 96–108)
CO2 SERPL-SCNC: 23 MMOL/L — SIGNIFICANT CHANGE UP (ref 22–31)
CREAT SERPL-MCNC: 0.87 MG/DL — SIGNIFICANT CHANGE UP (ref 0.5–1.3)
CULTURE RESULTS: SIGNIFICANT CHANGE UP
EOSINOPHIL # BLD AUTO: 0.49 K/UL — SIGNIFICANT CHANGE UP (ref 0–0.5)
EOSINOPHIL NFR BLD AUTO: 4.8 % — SIGNIFICANT CHANGE UP (ref 0–6)
FIBRINOGEN PPP-MCNC: 818 MG/DL — HIGH (ref 350–510)
GLUCOSE SERPL-MCNC: 98 MG/DL — SIGNIFICANT CHANGE UP (ref 70–99)
HCT VFR BLD CALC: 28.9 % — LOW (ref 34.5–45)
HGB BLD-MCNC: 9.2 G/DL — LOW (ref 11.5–15.5)
IMM GRANULOCYTES NFR BLD AUTO: 0.4 % — SIGNIFICANT CHANGE UP (ref 0–1.5)
INR BLD: 0.99 RATIO — SIGNIFICANT CHANGE UP (ref 0.88–1.16)
LDH SERPL L TO P-CCNC: 214 U/L — SIGNIFICANT CHANGE UP (ref 50–242)
LYMPHOCYTES # BLD AUTO: 2.16 K/UL — SIGNIFICANT CHANGE UP (ref 1–3.3)
LYMPHOCYTES # BLD AUTO: 21.2 % — SIGNIFICANT CHANGE UP (ref 13–44)
MCHC RBC-ENTMCNC: 29.8 PG — SIGNIFICANT CHANGE UP (ref 27–34)
MCHC RBC-ENTMCNC: 31.8 GM/DL — LOW (ref 32–36)
MCV RBC AUTO: 93.5 FL — SIGNIFICANT CHANGE UP (ref 80–100)
METHOD TYPE: SIGNIFICANT CHANGE UP
METHOD TYPE: SIGNIFICANT CHANGE UP
MONOCYTES # BLD AUTO: 0.79 K/UL — SIGNIFICANT CHANGE UP (ref 0–0.9)
MONOCYTES NFR BLD AUTO: 7.8 % — SIGNIFICANT CHANGE UP (ref 2–14)
NEUTROPHILS # BLD AUTO: 6.67 K/UL — SIGNIFICANT CHANGE UP (ref 1.8–7.4)
NEUTROPHILS NFR BLD AUTO: 65.6 % — SIGNIFICANT CHANGE UP (ref 43–77)
NRBC # BLD: 0 /100 WBCS — SIGNIFICANT CHANGE UP (ref 0–0)
ORGANISM # SPEC MICROSCOPIC CNT: SIGNIFICANT CHANGE UP
PLATELET # BLD AUTO: 414 K/UL — HIGH (ref 150–400)
POTASSIUM SERPL-MCNC: 3.5 MMOL/L — SIGNIFICANT CHANGE UP (ref 3.5–5.3)
POTASSIUM SERPL-SCNC: 3.5 MMOL/L — SIGNIFICANT CHANGE UP (ref 3.5–5.3)
PROT SERPL-MCNC: 6.1 G/DL — SIGNIFICANT CHANGE UP (ref 6–8.3)
PROTHROM AB SERPL-ACNC: 11.3 SEC — SIGNIFICANT CHANGE UP (ref 10–12.9)
RBC # BLD: 3.09 M/UL — LOW (ref 3.8–5.2)
RBC # FLD: 14 % — SIGNIFICANT CHANGE UP (ref 10.3–14.5)
SODIUM SERPL-SCNC: 139 MMOL/L — SIGNIFICANT CHANGE UP (ref 135–145)
SPECIMEN SOURCE: SIGNIFICANT CHANGE UP
URATE SERPL-MCNC: 5.2 MG/DL — SIGNIFICANT CHANGE UP (ref 2.5–7)
WBC # BLD: 10.17 K/UL — SIGNIFICANT CHANGE UP (ref 3.8–10.5)
WBC # FLD AUTO: 10.17 K/UL — SIGNIFICANT CHANGE UP (ref 3.8–10.5)

## 2019-12-28 PROCEDURE — 82570 ASSAY OF URINE CREATININE: CPT

## 2019-12-28 PROCEDURE — 85384 FIBRINOGEN ACTIVITY: CPT

## 2019-12-28 PROCEDURE — 99292 CRITICAL CARE ADDL 30 MIN: CPT

## 2019-12-28 PROCEDURE — 87186 SC STD MICRODIL/AGAR DIL: CPT

## 2019-12-28 PROCEDURE — 80053 COMPREHEN METABOLIC PANEL: CPT

## 2019-12-28 PROCEDURE — 84550 ASSAY OF BLOOD/URIC ACID: CPT

## 2019-12-28 PROCEDURE — 99291 CRITICAL CARE FIRST HOUR: CPT

## 2019-12-28 PROCEDURE — 85027 COMPLETE CBC AUTOMATED: CPT

## 2019-12-28 PROCEDURE — 93010 ELECTROCARDIOGRAM REPORT: CPT

## 2019-12-28 PROCEDURE — 87070 CULTURE OTHR SPECIMN AEROBIC: CPT

## 2019-12-28 PROCEDURE — 83615 LACTATE (LD) (LDH) ENZYME: CPT

## 2019-12-28 PROCEDURE — 84156 ASSAY OF PROTEIN URINE: CPT

## 2019-12-28 PROCEDURE — 83735 ASSAY OF MAGNESIUM: CPT

## 2019-12-28 PROCEDURE — 85610 PROTHROMBIN TIME: CPT

## 2019-12-28 PROCEDURE — 85730 THROMBOPLASTIN TIME PARTIAL: CPT

## 2019-12-28 PROCEDURE — 74177 CT ABD & PELVIS W/CONTRAST: CPT

## 2019-12-28 PROCEDURE — 81001 URINALYSIS AUTO W/SCOPE: CPT

## 2019-12-28 RX ORDER — LABETALOL HCL 100 MG
2 TABLET ORAL
Qty: 84 | Refills: 0
Start: 2019-12-28 | End: 2020-01-10

## 2019-12-28 RX ORDER — LABETALOL HCL 100 MG
20 TABLET ORAL ONCE
Refills: 0 | Status: DISCONTINUED | OUTPATIENT
Start: 2019-12-28 | End: 2019-12-28

## 2019-12-28 RX ADMIN — Medication 400 MILLIGRAM(S): at 00:56

## 2019-12-28 RX ADMIN — HEPARIN SODIUM 5000 UNIT(S): 5000 INJECTION INTRAVENOUS; SUBCUTANEOUS at 05:30

## 2019-12-28 RX ADMIN — Medication 975 MILLIGRAM(S): at 02:00

## 2019-12-28 RX ADMIN — Medication 1 TABLET(S): at 05:30

## 2019-12-28 RX ADMIN — Medication 975 MILLIGRAM(S): at 01:06

## 2019-12-28 RX ADMIN — Medication 975 MILLIGRAM(S): at 09:39

## 2019-12-28 RX ADMIN — Medication 975 MILLIGRAM(S): at 09:09

## 2019-12-28 RX ADMIN — Medication 400 MILLIGRAM(S): at 09:09

## 2019-12-28 RX ADMIN — Medication 600 MILLIGRAM(S): at 00:40

## 2019-12-28 NOTE — DISCHARGE NOTE OB - HOSPITAL COURSE
Patient was admitted on POD#8 s/p C/S with intermittent headaches that were unresolved with Tylenol and elevated BPs. She was also noted to have induration surrounding C/S incision and underwent a CT of abdomen/pelvis which did not reveal any concern for collection or infection. She has been on Augmentin and will continue for a total of 7 days. She continues with wet to dry dressing to the area. She now remains on Labetalol 400mg with blood pressures well controlled. She will have close follow up with clinic. Patient was admitted on POD#8 s/p C/S with intermittent headaches that were unresolved with Tylenol and elevated BPs. She was also noted to have induration surrounding C/S incision and underwent a CT of abdomen/pelvis which did not reveal any concern for collection or infection. She has been on Augmentin and will continue for a total of 7 days. She continues with wet to dry dressing to the area. She now remains on Labetalol 400mg with blood pressures well controlled. She will have close follow up with clinic and will be seen on Tuesday for blood pressure check.

## 2019-12-28 NOTE — DISCHARGE NOTE OB - CARE PROVIDER_API CALL
[facility],   [address] [place] [address]  Second Floor  Phone: [phone]  Fax: (   )    -  Follow Up Time:

## 2019-12-28 NOTE — DISCHARGE NOTE OB - PROVIDER TOKENS
FREE:[LAST:[HR],PHONE:[phone]],FAX:[(   )    -],ADDRESS:[[address] [place] [address]]]

## 2019-12-28 NOTE — DISCHARGE NOTE OB - CARE PLAN
Principal Discharge DX:	Pre-eclampsia  Goal:	Recovery  Assessment and plan of treatment:	Check BP at home with BP cuff prior to taking blood pressure medication. If BP >160 systolic and/or >110 diastolic, recheck in 15 minutes. If it remains elevated, Call your provider and come to the hospital. If you develop headache that is unresolved with tylenols, blurry vision or right upper quadrant pain, Call the office for an appointment your provider. Principal Discharge DX:	Pre-eclampsia  Goal:	Recovery  Assessment and plan of treatment:	Check BP at home with BP cuff prior to taking blood pressure medication. If BP >160 systolic and/or >110 diastolic, recheck in 15 minutes. If it remains elevated, Call your provider and come to the hospital. If you develop headache that is unresolved with tylenols, blurry vision or right upper quadrant pain, Call your provider or come to the ED. If BP is <110 systolic or < 60 diastolic, hold your medication.     CALL CLINIC ON MONDAY MORNING TO BE SEEN ON TUESDAY FOR BLOOD PRESSURE CHECK.  Secondary Diagnosis:	Wound infection  Assessment and plan of treatment:	Continue Augmentin for 6 more days. Continue wet to dry dressing on wound. Keep wound clean and dry at all times.

## 2019-12-28 NOTE — PROGRESS NOTE ADULT - PROBLEM SELECTOR PLAN 1
-s/p lab 20 IVP 12/26  -s/p Mg for seizure ppx  -c/w labetalol to 400 mg TID  -continue monitor BPs. Controlled
-s/p lab 20 IVP 12/26  -MgSO4 for seizure ppx. Discontinue 11pm 12/27  -Increased labetalol to 400 mg TID  -continue monitor BPs

## 2019-12-28 NOTE — ED ADULT TRIAGE NOTE - CHIEF COMPLAINT QUOTE
pt c/o "HTN post partum.  on 19. Just d/c'd yesterday from L&D with post partum pre-eclampsia and received Mag"

## 2019-12-28 NOTE — PROGRESS NOTE ADULT - ASSESSMENT
29y  POD#10 status post rLTCS readmitted for severe siPEC requiring lab 20 IVP. S/p mgso4. Pt also with wound infection, likely cellulitis. VSS and asymptomatic.
29y  POD#8 status post rLTCS readmitted for severe siPEC requiring lab 20 IVP. Pt also with wound infection, likely cellulitis. VSS and asymptomatic.

## 2019-12-28 NOTE — PROGRESS NOTE ADULT - ATTENDING COMMENTS
Agree with above.  Patient seen at bedside, reports feeling well, denies toxic symptoms, pain controlled.  BP reviewed and stable on labetalol 400mg TID.  [facility].  To continue augmentin PO as outpatient for SSI.

## 2019-12-28 NOTE — DISCHARGE NOTE OB - PLAN OF CARE
Recovery Check BP at home with BP cuff prior to taking blood pressure medication. If BP >160 systolic and/or >110 diastolic, recheck in 15 minutes. If it remains elevated, Call your provider and come to the hospital. If you develop headache that is unresolved with tylenols, blurry vision or right upper quadrant pain, Call the office for an appointment your provider. Check BP at home with BP cuff prior to taking blood pressure medication. If BP >160 systolic and/or >110 diastolic, recheck in 15 minutes. If it remains elevated, Call your provider and come to the hospital. If you develop headache that is unresolved with tylenols, blurry vision or right upper quadrant pain, Call your provider or come to the ED. If BP is <110 systolic or < 60 diastolic, hold your medication.     CALL CLINIC ON MONDAY MORNING TO BE SEEN ON TUESDAY FOR BLOOD PRESSURE CHECK. Continue Augmentin for 6 more days. Continue wet to dry dressing on wound. Keep wound clean and dry at all times.

## 2019-12-28 NOTE — PROGRESS NOTE ADULT - SUBJECTIVE AND OBJECTIVE BOX
OB Progress Note POD#9, HD#2    S: Her pain is well controlled. She is tolerating a regular diet and passing flatus. Voiding spontaneously and ambulating without difficulty. Denies N/V. Denies HA, visual changesCP/SOB/lightheadedness/dizziness, Denies RUQ/epigastric pain.    O:   Vitals:  Vital Signs Last 24 Hrs  T(C): 36.5 (27 Dec 2019 06:01), Max: 36.9 (26 Dec 2019 23:45)  T(F): 97.7 (27 Dec 2019 06:01), Max: 98.4 (26 Dec 2019 23:45)  HR: 70 (27 Dec 2019 06:01) (58 - 88)  BP: 114/78 (27 Dec 2019 06:01) (114/78 - 171/97)  BP(mean): 122 (26 Dec 2019 23:45) (115 - 122)  RR: 18 (27 Dec 2019 06:01) (14 - 18)  SpO2: 98% (27 Dec 2019 06:01) (96% - 100%)    MEDICATIONS  (STANDING):  amoxicillin  875 milliGRAM(s)/clavulanate 1 Tablet(s) Oral every 12 hours  heparin  Injectable 5000 Unit(s) SubCutaneous every 12 hours  labetalol 400 milliGRAM(s) Oral every 8 hours  magnesium sulfate Infusion 2 Gm/Hr (50 mL/Hr) IV Continuous <Continuous>  prenatal multivitamin 1 Tablet(s) Oral daily    MEDICATIONS  (PRN):  acetaminophen   Tablet .. 975 milliGRAM(s) Oral every 6 hours PRN Mild Pain (1 - 3)  ibuprofen  Tablet. 600 milliGRAM(s) Oral every 6 hours PRN Moderate Pain (4 - 6)      Labs:  Blood type: O Positive  Rubella IgG: RPR: Negative                          9.7<L>   12.94<H> >-----------< 404<H>    ( 12-26 @ 18:24 )             29.9<L>    12-26-19 @ 18:24      139  |  102  |  6<L>  ----------------------------<  93  4.0   |  23  |  0.78        Ca    9.5      26 Dec 2019 18:24  Mg     4.0<H>     12-27    TPro  6.7  /  Alb  3.5  /  TBili  0.4  /  DBili  x   /  AST  16  /  ALT  19  /  AlkPhos  119  12-26-19 @ 18:24          PE:  General: NAD  Abdomen: mildly distended, minimally tender  Incision: unchanged. indurated and erythematous superior to the incision with a 0.5 cm area of wound separation at the R angle of the incision. No active drainage seen. Wet to dry dressing replaced this AM.  Extremities: SCDs in place, no erythema
OB Progress Note POD#10, HD#3    S: Her pain is well controlled. She is tolerating a regular diet and passing flatus. Voiding spontaneously and ambulating without difficulty. Denies N/V. Denies HA, visual changesCP/SOB/lightheadedness/dizziness, Denies RUQ/epigastric pain.    O:   Vital Signs Last 24 Hrs  T(C): 36.8 (28 Dec 2019 05:34), Max: 36.8 (28 Dec 2019 01:01)  T(F): 98.3 (28 Dec 2019 05:34), Max: 98.3 (28 Dec 2019 05:34)  HR: 74 (28 Dec 2019 05:34) (71 - 76)  BP: 122/79 (28 Dec 2019 05:34) (110/68 - 133/86)  BP(mean): --  RR: 18 (28 Dec 2019 05:34) (18 - 18)  SpO2: 98% (28 Dec 2019 05:34) (97% - 100%)    I&O's Detail    27 Dec 2019 07:01  -  28 Dec 2019 07:00  --------------------------------------------------------  IN:    magnesium sulfate  Infusion: 450 mL    Oral Fluid: 940 mL  Total IN: 1390 mL    OUT:    Voided: 1700 mL  Total OUT: 1700 mL    Total NET: -310 mL    PE:  General: NAD  Abdomen: mildly distended, minimally tender  Incision: unchanged. indurated and erythematous superior to the incision with a 0.5 cm area of wound separation at the R angle of the incision. No active drainage seen. Wet to dry dressing replaced this AM.  Extremities: SCDs in place, no erythema                            9.2    10.17 )-----------( 414      ( 28 Dec 2019 06:41 )             28.9           139  |  102  |  7   ----------------------------<  98  3.5   |  23  |  0.87    Ca    8.4      28 Dec 2019 06:41  Mg     5.3         TPro  6.1  /  Alb  3.1<L>  /  TBili  0.2  /  DBili  x   /  AST  13  /  ALT  14  /  AlkPhos  111  12-    LIVER FUNCTIONS - ( 28 Dec 2019 06:41 )  Alb: 3.1 g/dL / Pro: 6.1 g/dL / ALK PHOS: 111 U/L / ALT: 14 U/L / AST: 13 U/L / GGT: x             PT/INR - ( 28 Dec 2019 06:41 )   PT: 11.3 sec;   INR: 0.99 ratio         PTT - ( 28 Dec 2019 06:41 )  PTT:27.6 sec    Urinalysis Basic - ( 26 Dec 2019 19:06 )    Color: Light Yellow / Appearance: Clear / S.005 / pH: x  Gluc: x / Ketone: Negative  / Bili: Negative / Urobili: Negative   Blood: x / Protein: Negative / Nitrite: Negative   Leuk Esterase: Moderate / RBC: 3 /hpf / WBC 3 /HPF   Sq Epi: x / Non Sq Epi: 2 /hpf / Bacteria: Negative        Labs:  Blood type: O Positive  Rubella IgG: RPR: Negative

## 2019-12-28 NOTE — DISCHARGE NOTE OB - PATIENT PORTAL LINK FT
You can access the FollowMyHealth Patient Portal offered by [facility] by registering at the following website: [email or web address]. By joining [email or web address]’s FollowMyHealth portal, you will also be able to view your health information using other applications (apps) compatible with our system.

## 2019-12-28 NOTE — PROGRESS NOTE ADULT - PROBLEM SELECTOR PLAN 2
-CT A/P showing postparum uterus  -Incision cleaned with normal saline. Counseled pt re: wound care. Wet to dry dressing placed. To be changed BID  -Augmentin started for presumed incision cellulits.  -F/u wound cx (12/26)- growing mod e-coli, few proteus mirabilas, few enterococcus. (prelim). F/u susceptibilities
-f/u CT abdomen pelvis to r/o incisional abscess vs. intrabdominal collection. Prelim shows no collections, normal post op changes.  -Incision cleaned with normal saline. Counseled pt re: wound care. Wet to dry dressing placed. To be changed BID  -Augmentin started for presumed incision cellulits.  -F/u wound cx (12/26)

## 2019-12-28 NOTE — DISCHARGE NOTE OB - MEDICATION SUMMARY - MEDICATIONS TO TAKE
I will START or STAY ON the medications listed below when I get home from the hospital:    acetaminophen 325 mg oral tablet  -- 3 tab(s) by mouth   -- Indication: For Pain    labetalol 200 mg oral tablet  -- 2 tab(s) by mouth every 8 hours, check BP prior to taking and hold if BP <110 / <60   -- Indication: For Blood pressure medications    Prenatal Multivitamins oral tablet  -- 1 tab(s) by mouth once a day  -- Indication: For Vitamin    amoxicillin-clavulanate 875 mg-125 mg oral tablet  -- 1 tab(s) by mouth every 12 hours  -- Indication: For Antibiotic

## 2019-12-29 ENCOUNTER — TRANSCRIPTION ENCOUNTER (OUTPATIENT)
Age: 29
End: 2019-12-29

## 2019-12-29 VITALS
RESPIRATION RATE: 18 BRPM | HEART RATE: 75 BPM | DIASTOLIC BLOOD PRESSURE: 77 MMHG | OXYGEN SATURATION: 98 % | TEMPERATURE: 98 F | SYSTOLIC BLOOD PRESSURE: 112 MMHG

## 2019-12-29 DIAGNOSIS — I10 ESSENTIAL (PRIMARY) HYPERTENSION: ICD-10-CM

## 2019-12-29 PROBLEM — I89.0 LYMPHEDEMA, NOT ELSEWHERE CLASSIFIED: Chronic | Status: ACTIVE | Noted: 2019-12-27

## 2019-12-29 LAB
ALBUMIN SERPL ELPH-MCNC: 3.3 G/DL — SIGNIFICANT CHANGE UP (ref 3.3–5)
ALBUMIN SERPL ELPH-MCNC: 3.3 G/DL — SIGNIFICANT CHANGE UP (ref 3.3–5)
ALP SERPL-CCNC: 104 U/L — SIGNIFICANT CHANGE UP (ref 40–120)
ALP SERPL-CCNC: 111 U/L — SIGNIFICANT CHANGE UP (ref 40–120)
ALT FLD-CCNC: 12 U/L — SIGNIFICANT CHANGE UP (ref 10–45)
ALT FLD-CCNC: 15 U/L — SIGNIFICANT CHANGE UP (ref 10–45)
ANION GAP SERPL CALC-SCNC: 13 MMOL/L — SIGNIFICANT CHANGE UP (ref 5–17)
ANION GAP SERPL CALC-SCNC: 13 MMOL/L — SIGNIFICANT CHANGE UP (ref 5–17)
APPEARANCE UR: CLEAR — SIGNIFICANT CHANGE UP
APTT BLD: 28.1 SEC — SIGNIFICANT CHANGE UP (ref 27.5–36.3)
APTT BLD: 29.1 SEC — SIGNIFICANT CHANGE UP (ref 27.5–36.3)
AST SERPL-CCNC: 12 U/L — SIGNIFICANT CHANGE UP (ref 10–40)
AST SERPL-CCNC: 8 U/L — LOW (ref 10–40)
BACTERIA # UR AUTO: NEGATIVE — SIGNIFICANT CHANGE UP
BASOPHILS # BLD AUTO: 0.03 K/UL — SIGNIFICANT CHANGE UP (ref 0–0.2)
BASOPHILS # BLD AUTO: 0.04 K/UL — SIGNIFICANT CHANGE UP (ref 0–0.2)
BASOPHILS NFR BLD AUTO: 0.3 % — SIGNIFICANT CHANGE UP (ref 0–2)
BASOPHILS NFR BLD AUTO: 0.4 % — SIGNIFICANT CHANGE UP (ref 0–2)
BILIRUB SERPL-MCNC: 0.2 MG/DL — SIGNIFICANT CHANGE UP (ref 0.2–1.2)
BILIRUB SERPL-MCNC: 0.2 MG/DL — SIGNIFICANT CHANGE UP (ref 0.2–1.2)
BILIRUB UR-MCNC: NEGATIVE — SIGNIFICANT CHANGE UP
BLD GP AB SCN SERPL QL: NEGATIVE — SIGNIFICANT CHANGE UP
BUN SERPL-MCNC: 7 MG/DL — SIGNIFICANT CHANGE UP (ref 7–23)
BUN SERPL-MCNC: 8 MG/DL — SIGNIFICANT CHANGE UP (ref 7–23)
CALCIUM SERPL-MCNC: 8.8 MG/DL — SIGNIFICANT CHANGE UP (ref 8.4–10.5)
CALCIUM SERPL-MCNC: 9.1 MG/DL — SIGNIFICANT CHANGE UP (ref 8.4–10.5)
CHLORIDE SERPL-SCNC: 102 MMOL/L — SIGNIFICANT CHANGE UP (ref 96–108)
CHLORIDE SERPL-SCNC: 104 MMOL/L — SIGNIFICANT CHANGE UP (ref 96–108)
CO2 SERPL-SCNC: 22 MMOL/L — SIGNIFICANT CHANGE UP (ref 22–31)
CO2 SERPL-SCNC: 22 MMOL/L — SIGNIFICANT CHANGE UP (ref 22–31)
COLOR SPEC: COLORLESS — SIGNIFICANT CHANGE UP
CREAT SERPL-MCNC: 0.77 MG/DL — SIGNIFICANT CHANGE UP (ref 0.5–1.3)
CREAT SERPL-MCNC: 0.91 MG/DL — SIGNIFICANT CHANGE UP (ref 0.5–1.3)
D DIMER BLD IA.RAPID-MCNC: 2245 NG/ML DDU — HIGH
DIFF PNL FLD: ABNORMAL
EOSINOPHIL # BLD AUTO: 0.48 K/UL — SIGNIFICANT CHANGE UP (ref 0–0.5)
EOSINOPHIL # BLD AUTO: 0.49 K/UL — SIGNIFICANT CHANGE UP (ref 0–0.5)
EOSINOPHIL NFR BLD AUTO: 4.4 % — SIGNIFICANT CHANGE UP (ref 0–6)
EOSINOPHIL NFR BLD AUTO: 5.1 % — SIGNIFICANT CHANGE UP (ref 0–6)
EPI CELLS # UR: 3 /HPF — SIGNIFICANT CHANGE UP
FIBRINOGEN PPP-MCNC: 698 MG/DL — HIGH (ref 350–510)
FIBRINOGEN PPP-MCNC: 854 MG/DL — HIGH (ref 350–510)
GLUCOSE SERPL-MCNC: 84 MG/DL — SIGNIFICANT CHANGE UP (ref 70–99)
GLUCOSE SERPL-MCNC: 84 MG/DL — SIGNIFICANT CHANGE UP (ref 70–99)
GLUCOSE UR QL: NEGATIVE — SIGNIFICANT CHANGE UP
HCT VFR BLD CALC: 29 % — LOW (ref 34.5–45)
HCT VFR BLD CALC: 29.7 % — LOW (ref 34.5–45)
HGB BLD-MCNC: 9 G/DL — LOW (ref 11.5–15.5)
HGB BLD-MCNC: 9.7 G/DL — LOW (ref 11.5–15.5)
HYALINE CASTS # UR AUTO: 0 /LPF — SIGNIFICANT CHANGE UP (ref 0–2)
IMM GRANULOCYTES NFR BLD AUTO: 0.3 % — SIGNIFICANT CHANGE UP (ref 0–1.5)
IMM GRANULOCYTES NFR BLD AUTO: 0.5 % — SIGNIFICANT CHANGE UP (ref 0–1.5)
INR BLD: 1.04 RATIO — SIGNIFICANT CHANGE UP (ref 0.88–1.16)
INR BLD: 1.07 RATIO — SIGNIFICANT CHANGE UP (ref 0.88–1.16)
KETONES UR-MCNC: NEGATIVE — SIGNIFICANT CHANGE UP
LDH SERPL L TO P-CCNC: 221 U/L — SIGNIFICANT CHANGE UP (ref 50–242)
LEUKOCYTE ESTERASE UR-ACNC: ABNORMAL
LYMPHOCYTES # BLD AUTO: 2.35 K/UL — SIGNIFICANT CHANGE UP (ref 1–3.3)
LYMPHOCYTES # BLD AUTO: 2.42 K/UL — SIGNIFICANT CHANGE UP (ref 1–3.3)
LYMPHOCYTES # BLD AUTO: 21.9 % — SIGNIFICANT CHANGE UP (ref 13–44)
LYMPHOCYTES # BLD AUTO: 24.9 % — SIGNIFICANT CHANGE UP (ref 13–44)
MAGNESIUM SERPL-MCNC: 2.4 MG/DL — SIGNIFICANT CHANGE UP (ref 1.6–2.6)
MCHC RBC-ENTMCNC: 29 PG — SIGNIFICANT CHANGE UP (ref 27–34)
MCHC RBC-ENTMCNC: 30.2 PG — SIGNIFICANT CHANGE UP (ref 27–34)
MCHC RBC-ENTMCNC: 31 GM/DL — LOW (ref 32–36)
MCHC RBC-ENTMCNC: 32.7 GM/DL — SIGNIFICANT CHANGE UP (ref 32–36)
MCV RBC AUTO: 92.5 FL — SIGNIFICANT CHANGE UP (ref 80–100)
MCV RBC AUTO: 93.5 FL — SIGNIFICANT CHANGE UP (ref 80–100)
MONOCYTES # BLD AUTO: 0.69 K/UL — SIGNIFICANT CHANGE UP (ref 0–0.9)
MONOCYTES # BLD AUTO: 0.78 K/UL — SIGNIFICANT CHANGE UP (ref 0–0.9)
MONOCYTES NFR BLD AUTO: 7.1 % — SIGNIFICANT CHANGE UP (ref 2–14)
MONOCYTES NFR BLD AUTO: 7.3 % — SIGNIFICANT CHANGE UP (ref 2–14)
NEUTROPHILS # BLD AUTO: 5.86 K/UL — SIGNIFICANT CHANGE UP (ref 1.8–7.4)
NEUTROPHILS # BLD AUTO: 7.29 K/UL — SIGNIFICANT CHANGE UP (ref 1.8–7.4)
NEUTROPHILS NFR BLD AUTO: 62 % — SIGNIFICANT CHANGE UP (ref 43–77)
NEUTROPHILS NFR BLD AUTO: 65.8 % — SIGNIFICANT CHANGE UP (ref 43–77)
NITRITE UR-MCNC: NEGATIVE — SIGNIFICANT CHANGE UP
NRBC # BLD: 0 /100 WBCS — SIGNIFICANT CHANGE UP (ref 0–0)
NRBC # BLD: 0 /100 WBCS — SIGNIFICANT CHANGE UP (ref 0–0)
PH UR: 6.5 — SIGNIFICANT CHANGE UP (ref 5–8)
PLATELET # BLD AUTO: 450 K/UL — HIGH (ref 150–400)
PLATELET # BLD AUTO: 469 K/UL — HIGH (ref 150–400)
POTASSIUM SERPL-MCNC: 3.7 MMOL/L — SIGNIFICANT CHANGE UP (ref 3.5–5.3)
POTASSIUM SERPL-MCNC: 3.9 MMOL/L — SIGNIFICANT CHANGE UP (ref 3.5–5.3)
POTASSIUM SERPL-SCNC: 3.7 MMOL/L — SIGNIFICANT CHANGE UP (ref 3.5–5.3)
POTASSIUM SERPL-SCNC: 3.9 MMOL/L — SIGNIFICANT CHANGE UP (ref 3.5–5.3)
PROT SERPL-MCNC: 6 G/DL — SIGNIFICANT CHANGE UP (ref 6–8.3)
PROT SERPL-MCNC: 6.4 G/DL — SIGNIFICANT CHANGE UP (ref 6–8.3)
PROT UR-MCNC: NEGATIVE — SIGNIFICANT CHANGE UP
PROTHROM AB SERPL-ACNC: 12 SEC — SIGNIFICANT CHANGE UP (ref 10–12.9)
PROTHROM AB SERPL-ACNC: 12.3 SEC — SIGNIFICANT CHANGE UP (ref 10–12.9)
RBC # BLD: 3.1 M/UL — LOW (ref 3.8–5.2)
RBC # BLD: 3.21 M/UL — LOW (ref 3.8–5.2)
RBC # FLD: 13.7 % — SIGNIFICANT CHANGE UP (ref 10.3–14.5)
RBC # FLD: 13.8 % — SIGNIFICANT CHANGE UP (ref 10.3–14.5)
RBC CASTS # UR COMP ASSIST: 3 /HPF — SIGNIFICANT CHANGE UP (ref 0–4)
RH IG SCN BLD-IMP: POSITIVE — SIGNIFICANT CHANGE UP
SODIUM SERPL-SCNC: 137 MMOL/L — SIGNIFICANT CHANGE UP (ref 135–145)
SODIUM SERPL-SCNC: 139 MMOL/L — SIGNIFICANT CHANGE UP (ref 135–145)
SP GR SPEC: 1 — LOW (ref 1.01–1.02)
TSH SERPL-MCNC: 0.3 UIU/ML — SIGNIFICANT CHANGE UP (ref 0.27–4.2)
URATE SERPL-MCNC: 4.2 MG/DL — SIGNIFICANT CHANGE UP (ref 2.5–7)
URATE SERPL-MCNC: 4.5 MG/DL — SIGNIFICANT CHANGE UP (ref 2.5–7)
URATE UR-MCNC: 8 MG/DL — SIGNIFICANT CHANGE UP
UROBILINOGEN FLD QL: NEGATIVE — SIGNIFICANT CHANGE UP
WBC # BLD: 11.06 K/UL — HIGH (ref 3.8–10.5)
WBC # BLD: 9.45 K/UL — SIGNIFICANT CHANGE UP (ref 3.8–10.5)
WBC # FLD AUTO: 11.06 K/UL — HIGH (ref 3.8–10.5)
WBC # FLD AUTO: 9.45 K/UL — SIGNIFICANT CHANGE UP (ref 3.8–10.5)
WBC UR QL: 44 /HPF — HIGH (ref 0–5)

## 2019-12-29 PROCEDURE — 84560 ASSAY OF URINE/URIC ACID: CPT

## 2019-12-29 PROCEDURE — 80053 COMPREHEN METABOLIC PANEL: CPT

## 2019-12-29 PROCEDURE — 85610 PROTHROMBIN TIME: CPT

## 2019-12-29 PROCEDURE — 81001 URINALYSIS AUTO W/SCOPE: CPT

## 2019-12-29 PROCEDURE — 84550 ASSAY OF BLOOD/URIC ACID: CPT

## 2019-12-29 PROCEDURE — 99285 EMERGENCY DEPT VISIT HI MDM: CPT

## 2019-12-29 PROCEDURE — 85027 COMPLETE CBC AUTOMATED: CPT

## 2019-12-29 PROCEDURE — 83615 LACTATE (LD) (LDH) ENZYME: CPT

## 2019-12-29 PROCEDURE — 93005 ELECTROCARDIOGRAM TRACING: CPT

## 2019-12-29 PROCEDURE — 83735 ASSAY OF MAGNESIUM: CPT

## 2019-12-29 PROCEDURE — 86850 RBC ANTIBODY SCREEN: CPT

## 2019-12-29 PROCEDURE — 85730 THROMBOPLASTIN TIME PARTIAL: CPT

## 2019-12-29 PROCEDURE — 85379 FIBRIN DEGRADATION QUANT: CPT

## 2019-12-29 PROCEDURE — 86900 BLOOD TYPING SEROLOGIC ABO: CPT

## 2019-12-29 PROCEDURE — 84443 ASSAY THYROID STIM HORMONE: CPT

## 2019-12-29 PROCEDURE — 85384 FIBRINOGEN ACTIVITY: CPT

## 2019-12-29 PROCEDURE — G0378: CPT

## 2019-12-29 PROCEDURE — 86901 BLOOD TYPING SEROLOGIC RH(D): CPT

## 2019-12-29 RX ORDER — HEPARIN SODIUM 5000 [USP'U]/ML
5000 INJECTION INTRAVENOUS; SUBCUTANEOUS EVERY 12 HOURS
Refills: 0 | Status: DISCONTINUED | OUTPATIENT
Start: 2019-12-29 | End: 2019-12-29

## 2019-12-29 RX ORDER — LABETALOL HCL 100 MG
400 TABLET ORAL EVERY 8 HOURS
Refills: 0 | Status: DISCONTINUED | OUTPATIENT
Start: 2019-12-29 | End: 2019-12-29

## 2019-12-29 RX ORDER — ACETAMINOPHEN 500 MG
975 TABLET ORAL EVERY 6 HOURS
Refills: 0 | Status: DISCONTINUED | OUTPATIENT
Start: 2019-12-29 | End: 2019-12-29

## 2019-12-29 RX ORDER — NIFEDIPINE 30 MG
1 TABLET, EXTENDED RELEASE 24 HR ORAL
Qty: 30 | Refills: 1
Start: 2019-12-29 | End: 2020-02-26

## 2019-12-29 RX ORDER — NIFEDIPINE 30 MG
30 TABLET, EXTENDED RELEASE 24 HR ORAL ONCE
Refills: 0 | Status: COMPLETED | OUTPATIENT
Start: 2019-12-29 | End: 2019-12-29

## 2019-12-29 RX ORDER — NIFEDIPINE 30 MG
30 TABLET, EXTENDED RELEASE 24 HR ORAL DAILY
Refills: 0 | Status: DISCONTINUED | OUTPATIENT
Start: 2019-12-29 | End: 2019-12-29

## 2019-12-29 RX ORDER — NICARDIPINE HYDROCHLORIDE 30 MG/1
20 CAPSULE, EXTENDED RELEASE ORAL ONCE
Refills: 0 | Status: DISCONTINUED | OUTPATIENT
Start: 2019-12-29 | End: 2019-12-29

## 2019-12-29 RX ADMIN — Medication 975 MILLIGRAM(S): at 04:10

## 2019-12-29 RX ADMIN — Medication 975 MILLIGRAM(S): at 03:18

## 2019-12-29 RX ADMIN — HEPARIN SODIUM 5000 UNIT(S): 5000 INJECTION INTRAVENOUS; SUBCUTANEOUS at 05:44

## 2019-12-29 RX ADMIN — Medication 30 MILLIGRAM(S): at 01:43

## 2019-12-29 RX ADMIN — Medication 1 TABLET(S): at 05:44

## 2019-12-29 RX ADMIN — Medication 1 TABLET(S): at 12:20

## 2019-12-29 RX ADMIN — Medication 1 TABLET(S): at 18:19

## 2019-12-29 RX ADMIN — Medication 975 MILLIGRAM(S): at 08:43

## 2019-12-29 RX ADMIN — Medication 400 MILLIGRAM(S): at 08:42

## 2019-12-29 RX ADMIN — Medication 1 TABLET(S): at 18:20

## 2019-12-29 RX ADMIN — Medication 400 MILLIGRAM(S): at 16:29

## 2019-12-29 NOTE — ED ADULT NURSE NOTE - NSIMPLEMENTINTERV_GEN_ALL_ED
Implemented All Fall Risk Interventions:  [place] to call system. Call bell, personal items and telephone within reach. Instruct patient to call for assistance. Room bathroom lighting operational. Non-slip footwear when patient is off stretcher. Physically safe environment: no spills, clutter or unnecessary equipment. Stretcher in lowest position, wheels locked, appropriate side rails in place. Provide visual cue, wrist band, yellow gown, etc. Monitor gait and stability. Monitor for mental status changes and reorient to person, place, and time. Review medications for side effects contributing to fall risk. Reinforce activity limits and safety measures with patient and family.

## 2019-12-29 NOTE — ED PROVIDER NOTE - OBJECTIVE STATEMENT
28 y/o F w/ PMH of htn and pre-eclampsia presenting w/ hypertension. Pt delivered twins on  (36 wks 2 days). Pt presents with mother. Was discharged from this hospital earlier today after being treated for pre-eclampsia. Pt returned to ED this evening due to elevated blood pressure at home, highest reading 170/118. Has been taking labetolol 400 mg TID. Compliant w/ meds, next dose due at 1 am. Has been having intermittent headaches for the past few weeks that improve w/ tylenol. Currently w/o headache. Pt has been taking augmentin for  incision cellulitis. Denies fevers, chills, dizziness, blurred vision, chest pain, cough, shortness of breath, n/v/d/c, urinary symptoms, MSK pain, rash.

## 2019-12-29 NOTE — DISCHARGE NOTE PROVIDER - NSDCFUSCHEDAPPT_GEN_ALL_CORE_FT
LUKASZ HUSSEIN ; 12/31/2019 ; [facility] PreAdmits  LUKASZ HUSSEIN ; 01/03/2020 ; NPP Maternal 865 OPD [facility]  LUKASZ HUSSEIN ; 01/13/2020 ; [facility] LUKASZ HUSSEIN ; 12/31/2019 ; [facility] PreAdmits  LUKASZ HUSSEIN ; 01/03/2020 ; NPP Maternal 865 OPD [facility]  LUKASZ HUSSEIN ; 01/13/2020 ; [facility] LUKASZ HUSSEIN ; 12/31/2019 ; [facility] PreAdmits  LUKASZ HUSSEIN ; 01/03/2020 ; NPP Maternal 865 OPD [facility]  LUKASZ HUSSEIN ; 01/13/2020 ; [facility] LUKASZ HUSSEIN ; 12/31/2019 ; [facility] PreAdmits  LUKASZ HUSSEIN ; 01/03/2020 ; NPP Maternal 865 OPD [facility]  LUKASZ HUSSEIN ; 01/13/2020 ; [facility]CC[facility] LUKASZ HUSSEIN ; 12/31/2019 ; [facility] PreAdmits  LUKASZ HUSSEIN ; 01/03/2020 ; NPP Maternal 865 OPD [facility]  LUKASZ HUSSEIN ; 01/13/2020 ; [facility] LUKASZ HUSSEIN ; 12/31/2019 ; [facility] PreAdmits  LUKASZ HUSSEIN ; 01/03/2020 ; NPP Maternal 865 OPD [facility]  LUKASZ HUSSEIN ; 01/13/2020 ; [facility] OCC[facility]ar

## 2019-12-29 NOTE — H&P ADULT - ASSESSMENT
29y   s/p rLTCS c/b siPEC on  here with elevated BP. BP elevated to severe range >160/110, however with no other symptoms of PEC. Given this presentation patient requires admission for BP control and IV Mg treatment for seizure ppx.     Plan:     Neuro: PO pain medication for relief.        -Seizure ppx: will start IV Mg for seizure ppx.  Will monitor closely for signs/symptoms of Mg toxicity.   CV: Hemodynamically stable.      -HTN:  s/p labetolol IV push x         Will start labetolol ....   for BP control.  Will continue to monitor BP closely.   Pulm: saturating well on RA  vs. concern for pulmonary edema given hx of SOB and crackles on exam- will order chest x-ray   GI: regular diet.  Colace prn   : Voiding spontanously   Heme: -DVT ppx: SCD's and HSQ for DVT ppx      -sPEC: will do HELLP labs q6 hours due to elevated....       ID: afebrile  FEN: Fluids: SLIV.   Electrolytes: replete prn  Endo: no acute issues  Dispo: admit to antepartum service     Simona Singleton PGY-3     gHTN:   BP elevated >140/90 however not in severe range.  HELLP labs all wnl.  Patient currently with no signs/symptoms of sPEC.  Reasonable to send patient home with strict PEC precautions- return if any severe HA not resolved with PO pain medication, blurry vision, intractable n/v, severe epigastric or RUQ pain or any concern for abnormal swelling.  Patient additionally instructed to take BP at home w/ home BP cuff      x/day and to report to her physician if any readings are elevated >160/110.    The diagnosis of gestational hypertension vs. pre-eclampsia vs. severe pre-eclampsia was explained to patient.  Explained precautions above.  All questions/concerns of patient addressed.      Mild PEC:    BP elevated with several BP readings >150/100 however none reaching the severe range.   Given these elevated readings will start patient on PO antihypertensive for BP control.   No need for IV Mg treatment at this time given all HELLP labs wnl, no severe range BP's and patient currently with no signs/symptoms of sPEC.   Reasonable to send patient home with strict PEC precautions-return if severe HA not resolved with PO pain medication, blurry vision, intractable n/v, severe epigastric or RUQ pain or any concern for abnormal swelling.  Patient additionally instructed to take BP at home w/ BP cuff    x/day   and to report to her physician if any readings are elevated >160/110.     The diagnosis of pre-eclampsia vs. severe pre-eclampsia was explained to the patient.  Explained the precautions above.  Explained that she should not take her BP medication if her systolic BP is <110 or her HR <60.  Reviewed the common side effects of labetalol treatment including orthostatic hypotension, dizziness, GI upset.  Explained to call her doctor if she experiences any severe lightheadness or dizziness after taking her labetalol medication.  All questions/concerns of patient addressed.        Plan  -would recommend to discharge patient home w/ prescription for home BP cuff and instructions to take BP x/day   -would recommend discharge home with labetolol.....  -patient instructed to follow up with OBGYN within 1-2 weeks  -no acute OB intervention at this time     Simona Singleton PGY-3 29y   s/p rLTCS c/b siPEC on  here with elevated BP. BP elevated to severe range >160/110, however with no other symptoms of PEC. Given this presentation patient requires admission for BP control. However, given recent Mg course, history of chronic HTN, and lack of neurological signs or symptoms, will hold Mg at this time.     Neuro: PO pain medication for relief.    CV: Hemodynamically stable.      -HTN:  severe-range BPs resolved without intervention. Continue labetalol 400 TID, start procardia 30.  Will continue to monitor BP closely.   Pulm: saturating well on RA  vs. concern for pulmonary edema given hx of SOB and crackles on exam- will order chest x-ray   GI: regular diet.  Colace prn   : Voiding spontanously   Heme: -DVT ppx: SCD's and HSQ for DVT ppx      -sPEC: will do HELLP labs q6 hours due to elevated....       ID: afebrile  FEN: Fluids: SLIV.   Electrolytes: replete prn  Endo: no acute issues  Dispo: admit to antepartum service 29y   s/p rLTCS c/b siPEC on  here with elevated BP. BP elevated to severe range >160/110, however with no other symptoms of PEC. Given this presentation patient requires admission for BP control. However, given recent Mg course, history of chronic HTN, and lack of neurological signs or symptoms, will hold Mg at this time.     Neuro: PO pain medication for relief.    CV: Hemodynamically stable.      -HTN:  severe-range BPs resolved without intervention. Continue labetalol 400 TID, start procardia 30.  Will continue to monitor BP closely.   Pulm: saturating well on RA   GI: regular diet.  Colace prn   : Voiding spontanously   Heme: -DVT ppx: SCD's and HSQ for DVT ppx      -sPEC: HELLP normal on admission. Trend as clinically indicated.       ID: afebrile. C/w augmentin for wound infection and wet-to-dry dressing changes  FEN: Fluids: SLIV.   Electrolytes: replete prn  Endo: no acute issues  Dispo: admit to antepartum service     Patient seen, evaluated, and discussed with Dr. Mario Neil MD PGY2  Pager #[phone]

## 2019-12-29 NOTE — ED ADULT NURSE NOTE - CHPI ED NUR SYMPTOMS NEG
no blurred vision/no change in level of consciousness/no confusion/no fever/no weakness/no vomiting/no loss of consciousness/no nausea/no numbness/no dizziness

## 2019-12-29 NOTE — ED PROVIDER NOTE - CARE PLAN
Principal Discharge DX:	Asymptomatic hypertension  Secondary Diagnosis:	Preeclampsia in postpartum period

## 2019-12-29 NOTE — DISCHARGE NOTE PROVIDER - PROVIDER TOKENS
FREE:[LAST:[[facility]],PHONE:[phone]],FAX:[(   [phone]    -],ADDRESS:[[address] [place] [address]]]

## 2019-12-29 NOTE — ED PROVIDER NOTE - PHYSICAL EXAMINATION
Gen: NAD, AOx3, able to make needs known, non-toxic  Head: NCAT  HEENT: EOMI, oral mucosa moist, normal conjunctiva  Lung: CTAB, no respiratory distress, no wheezes/rhonchi/rales B/L, speaking in full sentences  CV: RRR, no murmurs  Abd: soft, NTND, no guarding, no CVA tenderness  MSK: no visible deformities. L lower extremity markedly edematous (baseline per pt)  Neuro: No focal sensory or motor deficits. CN 2-12 intact  Skin: Warm, well perfused  Psych: normal affect

## 2019-12-29 NOTE — H&P ADULT - NSHPREVIEWOFSYSTEMS_GEN_ALL_CORE
REVIEW OF SYSTEMS      General: Denies fever, chills, weakness	    Skin/Breast: Denies breast pain  	  Respiratory and Thorax: Denies SOB, denies cough  	  Cardiovascular: Denies chest pain or palpitations    Gastrointestinal: Denies nausea/vomiting. Denies diarrhea    Genitourinary: Denies dysuria, increased urinary frequency, urgency	    Constitutional, Cardiovascular, Respiratory, Gastrointestinal, Genitourinary, Musculoskeletal, Neurological, and Integumentary review of systems are normal except as noted

## 2019-12-29 NOTE — ED ADULT NURSE NOTE - OBJECTIVE STATEMENT
pt is a 29 yr F presents with headache and hypertension. pt is 11 days post partum of  for twins, delivered at 36 weeks. pt was diagnosed with pre eclampsia post partum and received 24 hr of Mag on Friday, was discharged today but felt mild headache and noted her BP was high. pt denies current HA/dizziness/blurry vision/cp/sob/numbness/tingling. +bilateral leg swelling that pt reports is improving. pt a&ox4, no neuro deficits. family at bedside. pt on cardiac monitor. awaiting OBGYN at bedside.

## 2019-12-29 NOTE — ED PROVIDER NOTE - ATTENDING CONTRIBUTION TO CARE
------------ATTENDING NOTE------------   pt w/ mother c/o high blood pressure, complicated as 10 days post op C section/twins, dx w/ preeclampsia, compliant w/ labetalol, anna from [facility] today, asymptomtic now, took her Labetalol 400 mg PO after ED arrival (d/w OBGYN) , not meeting >160/>110 threshold for IV meds, ED Sign Out midnight pending labs/OBGYN consult for dispo/recs.  - Mario Pimentel MD   -----------------------------------------------

## 2019-12-29 NOTE — DISCHARGE NOTE NURSING/CASE MANAGEMENT/SOCIAL WORK - PATIENT PORTAL LINK FT
You can access the FollowMyHealth Patient Portal offered by [facility] by registering at the following website: [email or web address]. By joining [employer]’s FollowMyHealth portal, you will also be able to view your health information using other applications (apps) compatible with our system.

## 2019-12-29 NOTE — ED PROVIDER NOTE - CLINICAL SUMMARY MEDICAL DECISION MAKING FREE TEXT BOX
30 y/o F w/ PMH of htn and pre-eclampsia presenting w/ hypertension. Initially concerned for pre-eclampsia given hx and reported home BP readings. Pt asymptomatic in ED upon presentation. In ED BP readings did not meet criteria for pre-eclampsia, no IV meds given. Pt took home dose labetolol which OB agreed with, also requested procardia 30 mg XL. OB evaluated pt and accepted under their service for continued management of pre-eclampsia. Labs consistent w/ pts prior. At time of admission to OB service pt was HD stable. Will reassess the need for additional intervention as clinically indicated,.

## 2019-12-29 NOTE — DISCHARGE NOTE PROVIDER - CARE PROVIDER_API CALL
[facility],   [address] [place] [address] [address]  [facility]  Phone: [phone]  Fax: (   )    -  Follow Up Time:

## 2019-12-29 NOTE — ED PROVIDER NOTE - PROGRESS NOTE DETAILS
Dr. Trenton Pena, PGY-2: spoke w/ OB resident who will come eval pt. Ok w/ pt taking home dose oral labetalol. Dr. Trenton Pena, PGY-2: OB requested procardia 30 XL and admit to Dr. Guerra

## 2019-12-29 NOTE — ED PROVIDER NOTE - CRITICAL CARE PROVIDED
consult w/ pt's family directly relating to pts condition/documentation/additional history taking/consultation with other physicians/direct patient care (not related to procedure)/interpretation of diagnostic studies

## 2019-12-29 NOTE — H&P ADULT - ATTENDING COMMENTS
Patient seen and discussed with resident. Presents with severely elevated BP from home. Reports taking BP as instructed and states "it was high". Denies chest pain, shortness of breath, or headaches. Abdominal pain is present but has improved since starting PO antibiotics.   Patient s/p Magnesium sulfate therapy overnight 12/26 for 24hr course.   Vitals reviewed   Agree with resident exam, erythema improved from my previous examination   Labs reviewed and stable from previous admission, elevated D-Dimer (negative LE dopplers 12/21/19)   Plan: Magnesium sulfate not indicated at this time    -continue Labetalol 400mg PO TID   -add Procardia XL 30mg   -continue to monitor vitals including oxygen saturation  -continue Augmentin PO    -DVT PPx: Heparin SQ     ELIZABETH Martin

## 2019-12-29 NOTE — DISCHARGE NOTE PROVIDER - NSDCCPCAREPLAN_GEN_ALL_CORE_FT
PRINCIPAL DISCHARGE DIAGNOSIS  Diagnosis: Asymptomatic hypertension  Assessment and Plan of Treatment:       SECONDARY DISCHARGE DIAGNOSES  Diagnosis: Preeclampsia in postpartum period  Assessment and Plan of Treatment:

## 2019-12-29 NOTE — H&P ADULT - NSHPLABSRESULTS_GEN_ALL_CORE
9.7    11.06 )-----------( 469      ( 29 Dec 2019 00:04 )             29.7       12-29    137  |  102  |  8   ----------------------------<  84  3.9   |  22  |  0.91    Ca    8.8      29 Dec 2019 00:04  Mg     2.4     12-29    TPro  6.4  /  Alb  3.3  /  TBili  0.2  /  DBili  x   /  AST  12  /  ALT  15  /  AlkPhos  111  12-29          PT/INR - ( 29 Dec 2019 00:04 )   PT: 12.0 sec;   INR: 1.04 ratio         PTT - ( 29 Dec 2019 00:04 )  PTT:28.1 sec

## 2019-12-29 NOTE — DISCHARGE NOTE PROVIDER - HOSPITAL COURSE
29y   s/p rLTCS c/b siPEC on  presented with elevated BP. BP elevated to severe range >160/110.  Pt was started on procardia 30 XL and continued with home med of 400 labetalol TID.  During the day she c/o migraine which was treated with Fioricet and improved. BPs during the hospital stay were WNL.  Pt was d/c in stable condition.  She is to be seen in clinic on Tuesday for a BP check and will have home health to her house for BP checks.

## 2019-12-29 NOTE — DISCHARGE NOTE PROVIDER - NSDCMRMEDTOKEN_GEN_ALL_CORE_FT
acetaminophen 325 mg oral tablet: 3 tab(s) orally   amoxicillin-clavulanate 875 mg-125 mg oral tablet: 1 tab(s) orally every 12 hours  labetalol 200 mg oral tablet: 2 tab(s) orally every 8 hours, check BP prior to taking and hold if BP &lt;110 / &lt;60   NIFEdipine 30 mg oral tablet, extended release: 1 tab(s) orally once a day  Prenatal Multivitamins oral tablet: 1 tab(s) orally once a day acetaminophen 325 mg oral tablet: 3 tab(s) orally   amoxicillin-clavulanate 875 mg-125 mg oral tablet: 1 tab(s) orally every 12 hours  butalbital/acetaminophen/caffeine 50 mg-325 mg-40 mg oral capsule: 1 cap(s) orally 2 times a day MDD:4 TABS  labetalol 200 mg oral tablet: 2 tab(s) orally every 8 hours, check BP prior to taking and hold if BP &lt;110 / &lt;60   NIFEdipine 30 mg oral tablet, extended release: 1 tab(s) orally once a day  Prenatal Multivitamins oral tablet: 1 tab(s) orally once a day

## 2019-12-29 NOTE — H&P ADULT - HISTORY OF PRESENT ILLNESS
29y  POD#9 s/p rLTCS c/b sPEC and who was readmitted for a course of MgSO4 , discharged this morning on labetalol 400TID, now with severe-range blood pressures at home.    She has been on 200mg of labetalol BID since her last delivery in . She denies any blurry vision, spots in her vision and right upper quadrant pain. She denies nausea, vomiting, chest pain, shortness of breath, constipation, diarrhea, and dysuria. Patient endorses epigastric pain. Reports that incisional pain and tenderness is improved.      OB/GYN History: C/S@32wks  for severe pre-eclampsia, 2019 repeat C/S for TIUP c/b sPEC and superficial wound infection, denies fibroids, ovarian cysts, STDs and abnormal paps.  PAST MEDICAL & SURGICAL HISTORY:  cHTN  chronic LLE edema with multiple normal dopplers without evidence of DVTs    Allergies    No Known Allergies

## 2019-12-29 NOTE — ED PROVIDER NOTE - NS ED ROS FT
GENERAL: No fever or chills  EYES: No change in vision  HEENT: No trouble swallowing or speaking  CARDIAC: No chest pain  PULMONARY: No cough or SOB  GI: +mild abdominal pain, no nausea or no vomiting, no diarrhea or constipation  : No changes in urination  SKIN: No rashes  NEURO: No headache, no numbness  MSK: No joint pain  Otherwise as HPI or negative.

## 2019-12-29 NOTE — DISCHARGE NOTE NURSING/CASE MANAGEMENT/SOCIAL WORK - NSDCPNINST_GEN_ALL_CORE
please call your MD or go to nearest emergency room if Increased vaginal bleeding or large clots (saturating a pad an hour). Foul smelling vaginal discharge. Temperature greater than 100.0  F orally. Redness, swelling, yellow-green or bloody discharge from your incision. Severe abdominal/vaginal and/or rectal pain. Persistent headache, blurred vision. Swollen area on the leg that is painful, red or hot. Pain in the calves of your legs. Swollen, painful hot areas and/or streaks on the breast. Cracked, bleeding nipples. Mood swings / depression or crying spells lasting more than 3 days.

## 2019-12-29 NOTE — H&P ADULT - NSHPPHYSICALEXAM_GEN_ALL_CORE
Constitutional: alert and oriented x 3  Respiratory: clear  Cardiovascular: regular rate and rhythm  Gastrointestinal: soft, appropriately tender, + bowel sounds. No hepatosplenomegaly, no palpable masses. Discomfort to epigastric palpation  Genitourinary: normal lochia  Incision: indurated and erythematous superior to the incision with a 0.5 cm area of wound separation at the R angle of the incision. No active drainage seen, Wet to dry dressing in place.  Extremities: b/l LE edema L significantly L R, 2+ pitting edema LLE, RLE with 1+ non pitting edema. Unchanged from baseline lymphedema

## 2019-12-31 ENCOUNTER — APPOINTMENT (OUTPATIENT)
Dept: MATERNAL FETAL MEDICINE | Facility: CLINIC | Age: 29
End: 2019-12-31
Payer: COMMERCIAL

## 2019-12-31 ENCOUNTER — OUTPATIENT (OUTPATIENT)
Dept: OUTPATIENT SERVICES | Facility: HOSPITAL | Age: 29
LOS: 1 days | End: 2019-12-31
Payer: COMMERCIAL

## 2019-12-31 VITALS
HEART RATE: 62 BPM | TEMPERATURE: 97.3 F | DIASTOLIC BLOOD PRESSURE: 70 MMHG | HEIGHT: 67 IN | OXYGEN SATURATION: 96 % | SYSTOLIC BLOOD PRESSURE: 112 MMHG

## 2019-12-31 DIAGNOSIS — O09.899 SUPERVISION OF OTHER HIGH RISK PREGNANCIES, UNSPECIFIED TRIMESTER: ICD-10-CM

## 2019-12-31 DIAGNOSIS — O14.90 UNSPECIFIED PRE-ECLAMPSIA, UNSPECIFIED TRIMESTER: ICD-10-CM

## 2019-12-31 DIAGNOSIS — O09.299 SUPERVISION OF PREGNANCY WITH OTHER POOR REPRODUCTIVE OR OBSTETRIC HISTORY, UNSPECIFIED TRIMESTER: ICD-10-CM

## 2019-12-31 DIAGNOSIS — O30.049 TWIN PREGNANCY, DICHORIONIC/DIAMNIOTIC, UNSPECIFIED TRIMESTER: ICD-10-CM

## 2019-12-31 DIAGNOSIS — O21.9 VOMITING OF PREGNANCY, UNSPECIFIED: ICD-10-CM

## 2019-12-31 DIAGNOSIS — N90.89 OTHER SPECIFIED NONINFLAMMATORY DISORDERS OF VULVA AND PERINEUM: ICD-10-CM

## 2019-12-31 DIAGNOSIS — Z98.891 HISTORY OF UTERINE SCAR FROM PREVIOUS SURGERY: Chronic | ICD-10-CM

## 2019-12-31 DIAGNOSIS — G89.18 OTHER ACUTE POSTPROCEDURAL PAIN: ICD-10-CM

## 2019-12-31 DIAGNOSIS — O10.919 UNSPECIFIED PRE-EXISTING HYPERTENSION COMPLICATING PREGNANCY, UNSPECIFIED TRIMESTER: ICD-10-CM

## 2019-12-31 DIAGNOSIS — O34.219 MATERNAL CARE FOR UNSPECIFIED TYPE SCAR FROM PREVIOUS CESAREAN DELIVERY: ICD-10-CM

## 2019-12-31 DIAGNOSIS — A60.09 OTHER INFECTIONS WITH A PREDOMINANTLY SEXUAL MODE OF TRANSMISSION COMPLICATING PREGNANCY, UNSPECIFIED TRIMESTER: ICD-10-CM

## 2019-12-31 DIAGNOSIS — Z87.09 PERSONAL HISTORY OF OTHER DISEASES OF THE RESPIRATORY SYSTEM: ICD-10-CM

## 2019-12-31 DIAGNOSIS — O09.90 SUPERVISION OF HIGH RISK PREGNANCY, UNSPECIFIED, UNSPECIFIED TRIMESTER: ICD-10-CM

## 2019-12-31 DIAGNOSIS — O98.319 OTHER INFECTIONS WITH A PREDOMINANTLY SEXUAL MODE OF TRANSMISSION COMPLICATING PREGNANCY, UNSPECIFIED TRIMESTER: ICD-10-CM

## 2019-12-31 PROCEDURE — 99213 OFFICE O/P EST LOW 20 MIN: CPT

## 2019-12-31 PROCEDURE — G0463: CPT

## 2020-01-02 NOTE — HISTORY OF PRESENT ILLNESS
[Postpartum Follow Up] : postpartum follow up [Complications:___] : complications include: [unfilled] [Delivery Date: ___] : on [unfilled] [Repeat C/S] : delivered by  section (repeat) [Multiples: ___] : Delivery History: [unfilled] babies [Pertussis Vaccine] : Pertussis vaccine administered [Breastfeeding] : currently nursing [Intended Contraception] : Intended Contraception: [Partner Vasectomy] : has a partner with a vasectomy [None] : No associated symptoms are reported [Clean/Dry/Intact] : clean, dry and intact [Doing Well] : is doing well [Excellent Pain Control] : has excellent pain control [Rhogam] : Rhogam was not administered [Rubella Vaccine] : Rubella vaccine was not administered [BTL] : no tubal ligation [Resumed Menses] : has not resumed her menses [Resumed [place]] : has not resumed intercourse [Erythema] : not erythematous [Swelling] : not swollen [de-identified] : Partner  - vasectomy   [de-identified] : Cellulitis of wound resolving  Pt currently taking  Augmentin   [de-identified] : Garnulating well  small  0.5 cm separation noted  R distal portion of incision  cont daily irrigation pt instructed on proper wound care

## 2020-01-03 ENCOUNTER — APPOINTMENT (OUTPATIENT)
Dept: MATERNAL FETAL MEDICINE | Facility: CLINIC | Age: 30
End: 2020-01-03

## 2020-01-06 DIAGNOSIS — G89.18 OTHER ACUTE POSTPROCEDURAL PAIN: ICD-10-CM

## 2020-01-07 ENCOUNTER — OUTPATIENT (OUTPATIENT)
Dept: OUTPATIENT SERVICES | Facility: HOSPITAL | Age: 30
LOS: 1 days | End: 2020-01-07
Payer: SELF-PAY

## 2020-01-07 ENCOUNTER — APPOINTMENT (OUTPATIENT)
Dept: MATERNAL FETAL MEDICINE | Facility: CLINIC | Age: 30
End: 2020-01-07
Payer: COMMERCIAL

## 2020-01-07 VITALS — DIASTOLIC BLOOD PRESSURE: 84 MMHG | TEMPERATURE: 96.8 F | SYSTOLIC BLOOD PRESSURE: 122 MMHG

## 2020-01-07 DIAGNOSIS — I10 ESSENTIAL (PRIMARY) HYPERTENSION: ICD-10-CM

## 2020-01-07 DIAGNOSIS — Z98.891 HISTORY OF UTERINE SCAR FROM PREVIOUS SURGERY: Chronic | ICD-10-CM

## 2020-01-07 DIAGNOSIS — R51 HEADACHE: ICD-10-CM

## 2020-01-07 PROCEDURE — 99213 OFFICE O/P EST LOW 20 MIN: CPT

## 2020-01-07 PROCEDURE — G0463: CPT

## 2020-01-21 DIAGNOSIS — O10.013 PRE-EXISTING ESSENTIAL HYPERTENSION COMPLICATING PREGNANCY, THIRD TRIMESTER: ICD-10-CM

## 2020-01-21 DIAGNOSIS — O09.293 SUPERVISION OF PREGNANCY WITH OTHER POOR REPRODUCTIVE OR OBSTETRIC HISTORY, THIRD TRIMESTER: ICD-10-CM

## 2020-01-21 DIAGNOSIS — O30.043 TWIN PREGNANCY, DICHORIONIC/DIAMNIOTIC, THIRD TRIMESTER: ICD-10-CM

## 2020-01-21 DIAGNOSIS — Z3A.32 32 WEEKS GESTATION OF PREGNANCY: ICD-10-CM

## 2020-01-21 NOTE — HISTORY OF PRESENT ILLNESS
[Delivery Date: ___] : on [unfilled] [Repeat C/S] : delivered by  section (repeat) [Intended Contraception] : Intended Contraception: [BTL] : no tubal ligation [Resumed Menses] : has not resumed her menses [S/Sx PP Depression] : no signs/symptoms of postpartum depression [Resumed [place]] : has not resumed intercourse [de-identified] : Delivered at 36w2d  - Baby B was IUGR .   Readmitted on POD8 (12/26-28) with PP PEC, tx with magnesium sulfate, d/c'd on Labetalol 400 q 8 hr.   Readmitted again the night of 12/28 - 12/29, d/c'd on Labetalol 400 q 8 and Procardia 30 mg XL daily. [FreeTextEntry9] : Followed in [facility] for twins and cHTN, readmitted twice for PP PEC  [FreeTextEntry8] : 3 wk PP for B/P and wound check [de-identified] : Partner plans to have vasectomy [de-identified] : Today pt c/o constant h/a for the past several days.  Tylenol doesn't help, and pt tried Fioricet which provided incomplete relief.  Denies photophobia, phonophobia, n/v, RUQ/epgastric pain.  Feels she has "eye strain" but no scotoma or blurring.  Pt had similar h/a after last pregnancy and hx occsional migraine in between pregnancies.   Also c/o left chest pain --> left arm --> left scapula, "sore," "crampy," "sharp."  No change w/respiration.   Sometimes accompanied by palpitations.   Pt has had this CP in the past and has had multiple normal EKGs and neg cards workups.   Pt finished course of Augmentin for cellulitis in C/S wound, c/o "lumpiness" but no pain or drainage or wound separation.   No fever or chills.    Reports B/Ps at home 120s/80s early in the day, 130s/90s later in the day.   Highest 142/100 twice in the past week. [de-identified] : B/P 122/84  Temp 96.8    Incision is healed; one area, 0.5 cm at rt distal portion of incision, closed w/good granulation tissue. [de-identified] : Referred pt to cardiology for medication mgmt and eval of c/o chest pain.   If normal w/u, pt to schedule pulmonology appt.  Also referred to neuro for eval of h/a.   RTC 3 wk for postpartum check.   Pt states she's due for breast exam.   Recommended pain calendar -- record h/a, chest pain, and -- when menses restart -- bleeding calendar. to see if pain is cyclic.   PAULY Gleason    D/W Dr. Nolasco and Dr. Juanjose Sow [de-identified] : 28 yo , 3 wk s/p repeat C/S w/readmission for PP PEC x 2.   Compliant w/meds, normotensive today.   C/o chest pain -- not a new c/o.   Uncertain etiology.   Also c/o h/a, possibly migraine.

## 2020-01-23 ENCOUNTER — APPOINTMENT (OUTPATIENT)
Dept: CARDIOLOGY | Facility: CLINIC | Age: 30
End: 2020-01-23

## 2020-01-28 ENCOUNTER — APPOINTMENT (OUTPATIENT)
Dept: MATERNAL FETAL MEDICINE | Facility: CLINIC | Age: 30
End: 2020-01-28

## 2020-02-06 RX ORDER — LABETALOL HYDROCHLORIDE 300 MG/1
300 TABLET, FILM COATED ORAL
Qty: 60 | Refills: 0 | Status: ACTIVE | COMMUNITY
Start: 2020-01-16 | End: 1900-01-01

## 2020-02-07 ENCOUNTER — APPOINTMENT (OUTPATIENT)
Dept: MATERNAL FETAL MEDICINE | Facility: CLINIC | Age: 30
End: 2020-02-07

## 2020-02-14 ENCOUNTER — APPOINTMENT (OUTPATIENT)
Dept: MATERNAL FETAL MEDICINE | Facility: CLINIC | Age: 30
End: 2020-02-14
Payer: COMMERCIAL

## 2020-02-14 VITALS
SYSTOLIC BLOOD PRESSURE: 110 MMHG | DIASTOLIC BLOOD PRESSURE: 76 MMHG | WEIGHT: 293 LBS | HEART RATE: 71 BPM | BODY MASS INDEX: 47.38 KG/M2 | OXYGEN SATURATION: 98 %

## 2020-02-14 DIAGNOSIS — E66.01 MORBID (SEVERE) OBESITY DUE TO EXCESS CALORIES: ICD-10-CM

## 2020-02-14 DIAGNOSIS — Z20.828 CONTACT WITH AND (SUSPECTED) EXPOSURE TO OTHER VIRAL COMMUNICABLE DISEASES: ICD-10-CM

## 2020-02-14 PROCEDURE — 99214 OFFICE O/P EST MOD 30 MIN: CPT | Mod: 25

## 2020-02-18 ENCOUNTER — NON-APPOINTMENT (OUTPATIENT)
Age: 30
End: 2020-02-18

## 2020-02-19 ENCOUNTER — LABORATORY RESULT (OUTPATIENT)
Age: 30
End: 2020-02-19

## 2020-02-19 PROBLEM — Z20.828 EXPOSURE TO PARVOVIRUS: Status: ACTIVE | Noted: 2020-02-19

## 2020-03-09 ENCOUNTER — RX RENEWAL (OUTPATIENT)
Age: 30
End: 2020-03-09

## 2020-03-09 DIAGNOSIS — O09.899 SUPERVISION OF OTHER HIGH RISK PREGNANCIES, UNSPECIFIED TRIMESTER: ICD-10-CM

## 2020-03-29 NOTE — HISTORY OF PRESENT ILLNESS
[Postpartum Follow Up] : postpartum follow up [Last Pap Date: ___] : Last Pap Date: [unfilled] [Complications:___] : complications include: [unfilled] [Repeat C/S] : delivered by  section (repeat) [Delivery Date: ___] : on [unfilled] [Multiples: ___] : Delivery History: [unfilled] babies [NICU: ___] : NICU: [unfilled] [Pertussis Vaccine] : Pertussis vaccine administered [Discharge HCT: ___] : hematocrit level was [unfilled] [Discharge HGB: ___] : hemoglobin level was [unfilled] [Clean/Dry/Intact] : clean, dry and intact [Back to Normal] : is back to normal in size [Healed] : healed [None] : no vaginal bleeding [Examination Of The Breasts] : breasts are normal [Alert] : alert [Awake] : awake [Soft] : soft [Oriented x3] : oriented to person, place, and time [No Lesions] : no genitalia lesions [Labia Minora] : labia minora [Labia Majora] : labia major [Normal] : clitoris [Normal Position] : in a normal position [RRR, No Murmurs] : RRR, no murmurs [CTAB] : CTAB [Doing Well] : is doing well [Rhogam] : Rhogam was not administered [Rubella Vaccine] : Rubella vaccine was not administered [BTL] : no tubal ligation [Breastfeeding] : not currently nursing [Resumed Menses] : has not resumed her menses [Resumed [place]] : has not resumed intercourse [Intended Contraception] : the patient does not intended to use contraception postpartum [Swelling] : not swollen [Dehiscence] : not dehisced [Cervix Sample Taken] : cervical sample not taken for a Pap smear [Acute Distress] : no acute distress [LAD] : no lymphadenopathy [Thyroid Nodule] : no thyroid nodule [Goiter] : no goiter [Mass] : no breast mass [Nipple Discharge] : no nipple discharge [Axillary LAD] : no axillary lymphadenopathy [Tender] : non tender [Distended] : not distended [H/Smegaly] : no hepatosplenomegaly [Depressed Mood] : not depressed [Flat Affect] : affect not flat [Atrophy] : no atrophy [Erythema] : no erythema [Discharge] : had no discharge [IUD String] : did not have an IUD string protruding out [Motion Tenderness] : there was no cervical motion tenderness [Tenderness] : nontender [Enlarged ___ wks] : not enlarged [Mass ___ cm] : no uterine mass was palpated [Adnexa Tenderness] : were not tender [Ovarian Mass (___ Cm)] : there were no adnexal masses

## 2020-12-21 PROBLEM — N76.0 ACUTE VAGINITIS: Status: RESOLVED | Noted: 2019-07-29 | Resolved: 2020-12-21

## 2021-02-13 ENCOUNTER — RESULT REVIEW (OUTPATIENT)
Age: 31
End: 2021-02-13

## 2021-07-19 NOTE — PATIENT PROFILE ADULT - FALLEN IN THE PAST
Group Therapy Documentation    PATIENT'S NAME: Martín Shoemaker  MRN:   [id number]  :   1987  ACCT. NUMBER: [id number]  DATE OF SERVICE: 21  START TIME: 12:30 PM  END TIME:  2:30 PM  FACILITATOR(S): Juan Manuel Foster LADC; Bita Burkett  TOPIC: BEH Group Therapy  Number of patients attending the group:  6  Group Length:  2 Hours    Group Therapy Type: Health and wellbeing     Summary of Group / Topics Discussed:    Spiritual Care      Group Attendance:  Attended group session    Patient's response to the group topic/interactions:  cooperative with task    Patient appeared to be Actively participating.        Client specific details:  Martín participated and interacted appropriately with peers and staff in PM group. No triggers to use noted or discussed.       no

## 2021-09-28 NOTE — DISCHARGE NOTE OB - YES, WALK AS TOLERATED
For Pharmacy [address] in place:  No   Recommendation Provided To: Patient/Caregiver: 2 via Telephone   Intervention Detail: Adherence Monitorin   Gap Closed?: Yes    Intervention Accepted By: Patient/Caregiver: 1   Time Spent (min): 15 none Statement Selected

## 2021-11-02 NOTE — PATIENT PROFILE OB - AS LABOR ROM TYPE
artificial rupture Anesthesia Type: 1% lidocaine with 1:200,000 epinephrine and a 1:10 solution of 8.4% sodium bicarbonate

## 2023-01-06 ENCOUNTER — NON-APPOINTMENT (OUTPATIENT)
Age: 33
End: 2023-01-06

## 2023-03-01 ENCOUNTER — EMERGENCY (EMERGENCY)
Facility: HOSPITAL | Age: 33
LOS: 1 days | Discharge: HOME | End: 2023-03-01
Attending: STUDENT IN AN ORGANIZED HEALTH CARE EDUCATION/TRAINING PROGRAM
Payer: COMMERCIAL

## 2023-03-01 VITALS
TEMPERATURE: 98 F | DIASTOLIC BLOOD PRESSURE: 78 MMHG | HEART RATE: 67 BPM | RESPIRATION RATE: 15 BRPM | OXYGEN SATURATION: 100 % | SYSTOLIC BLOOD PRESSURE: 108 MMHG

## 2023-03-01 VITALS
OXYGEN SATURATION: 99 % | DIASTOLIC BLOOD PRESSURE: 93 MMHG | SYSTOLIC BLOOD PRESSURE: 131 MMHG | WEIGHT: 240.08 LBS | HEIGHT: 67 IN | HEART RATE: 99 BPM | TEMPERATURE: 98 F | RESPIRATION RATE: 18 BRPM

## 2023-03-01 DIAGNOSIS — Z98.891 HISTORY OF UTERINE SCAR FROM PREVIOUS SURGERY: Chronic | ICD-10-CM

## 2023-03-01 LAB
ALBUMIN SERPL ELPH-MCNC: 3.9 G/DL — SIGNIFICANT CHANGE UP (ref 3.3–5)
ALP SERPL-CCNC: 114 U/L — SIGNIFICANT CHANGE UP (ref 40–120)
ALT FLD-CCNC: 10 U/L — SIGNIFICANT CHANGE UP (ref 10–45)
ANION GAP SERPL CALC-SCNC: 8 MMOL/L — SIGNIFICANT CHANGE UP (ref 5–17)
APPEARANCE UR: ABNORMAL
AST SERPL-CCNC: 14 U/L — SIGNIFICANT CHANGE UP (ref 10–40)
BACTERIA # UR AUTO: ABNORMAL
BASOPHILS # BLD AUTO: 0.07 K/UL — SIGNIFICANT CHANGE UP (ref 0–0.2)
BASOPHILS NFR BLD AUTO: 0.7 % — SIGNIFICANT CHANGE UP (ref 0–2)
BILIRUB SERPL-MCNC: 0.3 MG/DL — SIGNIFICANT CHANGE UP (ref 0.2–1.2)
BILIRUB UR-MCNC: NEGATIVE — SIGNIFICANT CHANGE UP
BUN SERPL-MCNC: 14 MG/DL — SIGNIFICANT CHANGE UP (ref 7–23)
CALCIUM SERPL-MCNC: 9.5 MG/DL — SIGNIFICANT CHANGE UP (ref 8.4–10.5)
CHLORIDE SERPL-SCNC: 103 MMOL/L — SIGNIFICANT CHANGE UP (ref 96–108)
CO2 SERPL-SCNC: 27 MMOL/L — SIGNIFICANT CHANGE UP (ref 22–31)
COLOR SPEC: YELLOW — SIGNIFICANT CHANGE UP
CREAT SERPL-MCNC: 0.93 MG/DL — SIGNIFICANT CHANGE UP (ref 0.5–1.3)
DIFF PNL FLD: ABNORMAL
EGFR: 84 ML/MIN/1.73M2 — SIGNIFICANT CHANGE UP
EOSINOPHIL # BLD AUTO: 1.19 K/UL — HIGH (ref 0–0.5)
EOSINOPHIL NFR BLD AUTO: 12.3 % — HIGH (ref 0–6)
EPI CELLS # UR: 3 /HPF — SIGNIFICANT CHANGE UP
GLUCOSE SERPL-MCNC: 86 MG/DL — SIGNIFICANT CHANGE UP (ref 70–99)
GLUCOSE UR QL: NEGATIVE — SIGNIFICANT CHANGE UP
HCG SERPL-ACNC: <2 MIU/ML — SIGNIFICANT CHANGE UP
HCT VFR BLD CALC: 40.5 % — SIGNIFICANT CHANGE UP (ref 34.5–45)
HGB BLD-MCNC: 13.3 G/DL — SIGNIFICANT CHANGE UP (ref 11.5–15.5)
HYALINE CASTS # UR AUTO: 5 /LPF — HIGH (ref 0–2)
IMM GRANULOCYTES NFR BLD AUTO: 0.2 % — SIGNIFICANT CHANGE UP (ref 0–0.9)
KETONES UR-MCNC: NEGATIVE — SIGNIFICANT CHANGE UP
LEUKOCYTE ESTERASE UR-ACNC: ABNORMAL
LYMPHOCYTES # BLD AUTO: 3.95 K/UL — HIGH (ref 1–3.3)
LYMPHOCYTES # BLD AUTO: 40.8 % — SIGNIFICANT CHANGE UP (ref 13–44)
MCHC RBC-ENTMCNC: 30.2 PG — SIGNIFICANT CHANGE UP (ref 27–34)
MCHC RBC-ENTMCNC: 32.8 GM/DL — SIGNIFICANT CHANGE UP (ref 32–36)
MCV RBC AUTO: 92 FL — SIGNIFICANT CHANGE UP (ref 80–100)
MONOCYTES # BLD AUTO: 0.74 K/UL — SIGNIFICANT CHANGE UP (ref 0–0.9)
MONOCYTES NFR BLD AUTO: 7.6 % — SIGNIFICANT CHANGE UP (ref 2–14)
NEUTROPHILS # BLD AUTO: 3.72 K/UL — SIGNIFICANT CHANGE UP (ref 1.8–7.4)
NEUTROPHILS NFR BLD AUTO: 38.4 % — LOW (ref 43–77)
NITRITE UR-MCNC: POSITIVE
NRBC # BLD: 0 /100 WBCS — SIGNIFICANT CHANGE UP (ref 0–0)
PH UR: 5.5 — SIGNIFICANT CHANGE UP (ref 5–8)
PLATELET # BLD AUTO: 352 K/UL — SIGNIFICANT CHANGE UP (ref 150–400)
POTASSIUM SERPL-MCNC: 4.5 MMOL/L — SIGNIFICANT CHANGE UP (ref 3.5–5.3)
POTASSIUM SERPL-SCNC: 4.5 MMOL/L — SIGNIFICANT CHANGE UP (ref 3.5–5.3)
PROT SERPL-MCNC: 6.7 G/DL — SIGNIFICANT CHANGE UP (ref 6–8.3)
PROT UR-MCNC: NEGATIVE — SIGNIFICANT CHANGE UP
RBC # BLD: 4.4 M/UL — SIGNIFICANT CHANGE UP (ref 3.8–5.2)
RBC # FLD: 12.8 % — SIGNIFICANT CHANGE UP (ref 10.3–14.5)
RBC CASTS # UR COMP ASSIST: 1 /HPF — SIGNIFICANT CHANGE UP (ref 0–4)
SODIUM SERPL-SCNC: 138 MMOL/L — SIGNIFICANT CHANGE UP (ref 135–145)
SP GR SPEC: 1.01 — SIGNIFICANT CHANGE UP (ref 1.01–1.02)
UROBILINOGEN FLD QL: NEGATIVE — SIGNIFICANT CHANGE UP
WBC # BLD: 9.69 K/UL — SIGNIFICANT CHANGE UP (ref 3.8–10.5)
WBC # FLD AUTO: 9.69 K/UL — SIGNIFICANT CHANGE UP (ref 3.8–10.5)
WBC UR QL: 24 /HPF — HIGH (ref 0–5)

## 2023-03-01 PROCEDURE — 81001 URINALYSIS AUTO W/SCOPE: CPT

## 2023-03-01 PROCEDURE — 84702 CHORIONIC GONADOTROPIN TEST: CPT

## 2023-03-01 PROCEDURE — 87086 URINE CULTURE/COLONY COUNT: CPT

## 2023-03-01 PROCEDURE — 36415 COLL VENOUS BLD VENIPUNCTURE: CPT

## 2023-03-01 PROCEDURE — 99284 EMERGENCY DEPT VISIT MOD MDM: CPT | Mod: 25

## 2023-03-01 PROCEDURE — 85025 COMPLETE CBC W/AUTO DIFF WBC: CPT

## 2023-03-01 PROCEDURE — 96372 THER/PROPH/DIAG INJ SC/IM: CPT | Mod: XU

## 2023-03-01 PROCEDURE — 80053 COMPREHEN METABOLIC PANEL: CPT

## 2023-03-01 PROCEDURE — 96375 TX/PRO/DX INJ NEW DRUG ADDON: CPT

## 2023-03-01 PROCEDURE — 99285 EMERGENCY DEPT VISIT HI MDM: CPT

## 2023-03-01 PROCEDURE — 72131 CT LUMBAR SPINE W/O DYE: CPT | Mod: 26,MA

## 2023-03-01 PROCEDURE — 72131 CT LUMBAR SPINE W/O DYE: CPT | Mod: MA

## 2023-03-01 PROCEDURE — 96374 THER/PROPH/DIAG INJ IV PUSH: CPT

## 2023-03-01 PROCEDURE — 87186 SC STD MICRODIL/AGAR DIL: CPT

## 2023-03-01 RX ORDER — CEFDINIR 250 MG/5ML
1 POWDER, FOR SUSPENSION ORAL
Qty: 20 | Refills: 0
Start: 2023-03-01 | End: 2023-03-10

## 2023-03-01 RX ORDER — TIZANIDINE 4 MG/1
1 TABLET ORAL
Qty: 6 | Refills: 0
Start: 2023-03-01 | End: 2023-03-06

## 2023-03-01 RX ORDER — CEFTRIAXONE 500 MG/1
1000 INJECTION, POWDER, FOR SOLUTION INTRAMUSCULAR; INTRAVENOUS ONCE
Refills: 0 | Status: COMPLETED | OUTPATIENT
Start: 2023-03-01 | End: 2023-03-01

## 2023-03-01 RX ORDER — CEFDINIR 250 MG/5ML
1 POWDER, FOR SUSPENSION ORAL
Qty: 14 | Refills: 0
Start: 2023-03-01 | End: 2023-03-07

## 2023-03-01 RX ORDER — ACETAMINOPHEN 500 MG
975 TABLET ORAL ONCE
Refills: 0 | Status: COMPLETED | OUTPATIENT
Start: 2023-03-01 | End: 2023-03-01

## 2023-03-01 RX ORDER — CEFPODOXIME PROXETIL 100 MG
200 TABLET ORAL ONCE
Refills: 0 | Status: DISCONTINUED | OUTPATIENT
Start: 2023-03-01 | End: 2023-03-01

## 2023-03-01 RX ORDER — MORPHINE SULFATE 50 MG/1
4 CAPSULE, EXTENDED RELEASE ORAL ONCE
Refills: 0 | Status: DISCONTINUED | OUTPATIENT
Start: 2023-03-01 | End: 2023-03-01

## 2023-03-01 RX ORDER — KETOROLAC TROMETHAMINE 30 MG/ML
15 SYRINGE (ML) INJECTION ONCE
Refills: 0 | Status: DISCONTINUED | OUTPATIENT
Start: 2023-03-01 | End: 2023-03-01

## 2023-03-01 RX ORDER — LIDOCAINE 4 G/100G
1 CREAM TOPICAL ONCE
Refills: 0 | Status: COMPLETED | OUTPATIENT
Start: 2023-03-01 | End: 2023-03-01

## 2023-03-01 RX ORDER — CEFPODOXIME PROXETIL 100 MG
100 TABLET ORAL ONCE
Refills: 0 | Status: DISCONTINUED | OUTPATIENT
Start: 2023-03-01 | End: 2023-03-01

## 2023-03-01 RX ADMIN — CEFTRIAXONE 100 MILLIGRAM(S): 500 INJECTION, POWDER, FOR SOLUTION INTRAMUSCULAR; INTRAVENOUS at 06:54

## 2023-03-01 RX ADMIN — MORPHINE SULFATE 4 MILLIGRAM(S): 50 CAPSULE, EXTENDED RELEASE ORAL at 06:55

## 2023-03-01 RX ADMIN — Medication 15 MILLIGRAM(S): at 04:46

## 2023-03-01 NOTE — ED PROVIDER NOTE - PRINCIPAL DIAGNOSIS
Back pain You can access the FollowMyHealth Patient Portal offered by [facility] by registering at the following website: [email or web address]. By joining [employer]’s FollowMyHealth portal, you will also be able to view your health information using other applications (apps) compatible with our system.

## 2023-03-01 NOTE — ED PROVIDER NOTE - DIFFERENTIAL DIAGNOSIS
includes but not limited to back pain, hernia, slip, mulscle strain, sprain, not likely epidural abscess given no risks of IVDU Differential Diagnosis

## 2023-03-01 NOTE — ED PROVIDER NOTE - ATTENDING CONTRIBUTION TO CARE
I have personally seen and examined this patient.  I have fully participated in the care of this patient. I performed a substantive portion of the visit including all aspects of the medical decision making. I have reviewed all pertinent clinical information, including history, physical exam, plan and the Resident’s note and agree except as noted. - MD Gama.

## 2023-03-01 NOTE — ED PROVIDER NOTE - PHYSICAL EXAMINATION
PHYSICAL EXAM:  GENERAL: Sitting comfortable in bed, in no acute distress  HENMT: Atraumatic, moist mucous membranes, no oropharyngeal exudates or vesicles, uvula is midline EYES: Clear bilaterally, PERRL, EOMs intact b/l  HEART: RRR, S1/S2, no murmur  RESPIRATORY: Clear to auscultation bilaterally, no wheezes/rhonchi/rales  ABDOMEN: Soft, nontender, nondistended  MSK: No cervical or thoracic spinal ttp, lumbar spinal ttp  EXTREMITIES: B/l lower extremity non-pitting edema  NEURO: A&Ox4, CN II-XII grossly intact, no focal motor deficits or sensory deficits   SKIN: Skin normal color for race, warm, dry and intact

## 2023-03-01 NOTE — ED PROVIDER NOTE - PATIENT PORTAL LINK FT
You can access the FollowMyHealth Patient Portal offered by [facility] by registering at the following website: [email or web address]. By joining [employer]’s FollowMyHealth portal, you will also be able to view your health information using other applications (apps) compatible with our system.

## 2023-03-01 NOTE — ED PROVIDER NOTE - NSFOLLOWUPINSTRUCTIONS_ED_ALL_ED_FT
You have been evaluated in the Emergency Department today for lower back pain. Your evaluation did not show evidence of medical conditions requiring emergent intervention at this time.    For pain, you can take TYLENOL/ACETAMINOPHEN up to 4,000mg a day for your symptoms in four divided doses. You can also apply a lidocaine patch to your lower back. Leave the patch on for 12 hours and then leave off for 12 hours before applying a new one.     You were found to have a urinary tract infection. Please take the antibiotic as prescribed.    Please schedule an appointment with your primary care physician within 2-3 days for follow-up.    The Hospital will call you in a couple days to make an appointment with the [facility].     Return to the Emergency Department if you experience worsening or uncontrolled pain, worsening weakness, worsening numbness/tingling, fecal or urinary incontinence, fevers 100.4°F or greater, or any other concerning symptoms.    Thank you for choosing us for your care. You have been evaluated in the Emergency Department today for lower back pain. Your evaluation did not show evidence of medical conditions requiring emergent intervention at this time.    For pain, you can take TYLENOL/ACETAMINOPHEN up to 4,000mg a day for your symptoms in four divided doses. You can also apply a lidocaine patch to your lower back. Leave the patch on for 12 hours and then leave off for 12 hours before applying a new one.     Take Tizanidine one 4mg tab at night AS NEEDED for severe pain/muscle spasm *** caution can cause drowsiness do not drive or drink alcohol while taking this medication.     You were found to have a urinary tract infection. Please take the antibiotic as prescribed.    Please schedule an appointment with your primary care physician within 2-3 days for follow-up.    The Hospital will call you in a couple days to make an appointment with the Spine [facility].     Return to the Emergency Department if you experience worsening or uncontrolled pain, new/worsening weakness, numbness/tingling, fecal or urinary incontinence ro retention, fevers 100.4°F or greater, or any other concerning symptoms.    Thank you for choosing us for your care.

## 2023-03-01 NOTE — ED PROVIDER NOTE - OBJECTIVE STATEMENT
32F PMH HTN, lumbar herniated disk, endometriosis, lymphedema p/w lower back pain. Pt says that she has been having worsening intermittent lower back pain since November. 4 days ago, the pain became much worse and constant. The burning pain radiates down her b/l legs. Endorsing some numbness and b/l LE weakness. Feels like she still needs to urinate after voiding. No trauma or IV drug use. Denies urinary or fecal incontinence. No fever, N/V, CP, SOB, abdominal pain. 32F PMH HTN, lumbar herniated disk, endometriosis, lymphedema p/w lower back pain. Pt says that she has been having worsening intermittent lower back pain since November. Approx 4 days ago, the pain became much worse and constant. The burning pain radiates down her b/l legs. Endorsing some numbness and b/l LE weakness. Buttocks tingling after sitting for long periods. Feels like she still needs to urinate after voiding. No trauma or IV drug use. Denies urinary or fecal incontinence. No fever, N/V, CP, SOB, abdominal pain.

## 2023-03-01 NOTE — ED ADULT TRIAGE NOTE - CHIEF COMPLAINT QUOTE
coccyx pain since November w/ numbness and tingling in bilateral lower extremities. pmh of lumbar herniated disk 11/20. denies trauma/bowel bladder incontinence

## 2023-03-01 NOTE — ED PROVIDER NOTE - CLINICAL SUMMARY MEDICAL DECISION MAKING FREE TEXT BOX
32F PMH HTN, lumbar herniated disk, endometriosis, lymphedema p/w lower back pain. Neuro exam unremarkable. Not concerned for cauda equina, fracture, or epidural abscess given exam and history. Likely radicular pain. Will assess for UTI. Plan: pain meds, UA/UC. Will re-assess. 32F PMH HTN, lumbar herniated disk, endometriosis, lymphedema p/w lower back pain. Neuro exam unremarkable. Not concerned for cauda equina, fracture, or epidural abscess given exam and history. DDx includes but not limited to: sciatica vs piriformis syndrome. Will assess for UTI. Plan: pain meds, UA/UC. Will re-assess. 32F PMH HTN, lumbar herniated disk, endometriosis, lymphedema p/w lower back pain. Neuro exam unremarkable. Not concerned for cauda equina, fracture, or epidural abscess given exam and history. DDx includes but not limited to: sciatica vs piriformis syndrome. Will assess for UTI. Plan: pain meds, UA/UC, Ct lumbar spine. Will re-assess.

## 2023-03-01 NOTE — ED PROVIDER NOTE - PROGRESS NOTE DETAILS
Winsome Cortes M.D. (Resident Physician): Pt says pain has not decreased with toradol and tylenol. Will give morphine and re-assess. Winsome Cortes M.D. (Resident Physician): Pt says pain has not decreased with toradol and tylenol. Will give morphine and re-assess. Pt has UTI, will give ceftriaxone. VIVIENNE Archer: pt evaluated for CDU. pt feeling improved, wants to go home, doesn't want to stay in the observation unit but prefers to be discharged. pt states that pain similar to previous back pain flares. observed patient ambulating, able to ambulate steadily. informed the ED team, will evaluate and determine dispo. ap= pt signed out to me has outpt follow up w/ spine center, has coccyx pain, no abd tenderness, pt unable to take nsaids due to prior gastric sleeve, pt w/ numbness in the lower legs no weakness, pt works as a nurse here, reports that she has increased bending over the past 3-4 days, had intercourse on sat and that may have made things worse, pt reports no abnl vaginal discharge, no fevers, has known hx of endometriosis, pt reports tizanadine helps w/ her back pain in the past, pt w/ no trauma to the area, pt on exam w/ soft abd has tenderness along the coccyx, counselled to use supportive pillows, pt to take apap and tizandine as needed for sx, pt verbalized understanding on appropriate usage of meds, will also treat for pyelonephritis,

## 2023-03-01 NOTE — ED ADULT NURSE NOTE - OBJECTIVE STATEMENT
Pt presents to the ED co lower back pain since Novemeber. Worsened the last 4 days. Describes as radiating down bilateral legs. Endorses feeling weak, and feeling the need to void after urinating. Pt denies fevers, chills, NVD

## 2023-03-01 NOTE — ED ADULT NURSE NOTE - ED STAT RN HANDOFF DETAILS
Report endorsed to oncoming Eddie FLORES. Safety checks completed this shift. Safety rounds completed hourly.  IV sites checked Q2+remains WDL. Medications administered as ordered with no signs/symptoms of adverse reactions. Fall & skin precautions in place. Any issues endorsed to oncjanessa RN for follow up.

## 2023-03-01 NOTE — ED ADULT NURSE NOTE - NS ED NURSE LEVEL OF CONSCIOUSNESS ORIENTATION
Anxiety    Degenerative arthritis    Fracture  left leg fractured tibia/fibula (2011)  Hypertension Oriented - self; Oriented - place; Oriented - time

## 2023-03-02 ENCOUNTER — APPOINTMENT (OUTPATIENT)
Dept: ORTHOPEDIC SURGERY | Facility: CLINIC | Age: 33
End: 2023-03-02
Payer: COMMERCIAL

## 2023-03-02 DIAGNOSIS — Z86.59 PERSONAL HISTORY OF OTHER MENTAL AND BEHAVIORAL DISORDERS: ICD-10-CM

## 2023-03-02 DIAGNOSIS — Z86.79 PERSONAL HISTORY OF OTHER DISEASES OF THE CIRCULATORY SYSTEM: ICD-10-CM

## 2023-03-02 DIAGNOSIS — M53.3 SACROCOCCYGEAL DISORDERS, NOT ELSEWHERE CLASSIFIED: ICD-10-CM

## 2023-03-02 DIAGNOSIS — F41.1 GENERALIZED ANXIETY DISORDER: ICD-10-CM

## 2023-03-02 PROCEDURE — 99203 OFFICE O/P NEW LOW 30 MIN: CPT

## 2023-03-02 RX ORDER — IBUPROFEN 800 MG/1
800 TABLET, FILM COATED ORAL
Qty: 90 | Refills: 0 | Status: ACTIVE | COMMUNITY
Start: 2023-03-02 | End: 1900-01-01

## 2023-03-03 PROBLEM — Z86.79 HISTORY OF ESSENTIAL HYPERTENSION: Status: RESOLVED | Noted: 2023-03-03 | Resolved: 2023-03-03

## 2023-03-03 PROBLEM — F41.1 GENERALIZED ANXIETY DISORDER: Status: RESOLVED | Noted: 2023-03-03 | Resolved: 2023-03-03

## 2023-03-03 PROBLEM — Z86.59 HISTORY OF DEPRESSION: Status: RESOLVED | Noted: 2023-03-03 | Resolved: 2023-03-03

## 2023-03-03 NOTE — HISTORY OF PRESENT ILLNESS
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  She has a prior history of both neck and lower back symptoms as well.\par \par She has had 3 prior C-sections with the last in August of last year.  In 2021 she had a gastric sleeve procedure and has lost 75 pounds.  She is on medication for hypertension depression and anxiety.  She had an episode of pyelonephritis in November of last year.

## 2023-03-03 NOTE — ED POST DISCHARGE NOTE - RESULT SUMMARY
UCx PRELIM>100k E. coli, pt DC'ed on cefdinir, pending sensitivities to determine if need for call back vs appropriate care received. -Cosme Grady PA-C

## 2023-03-03 NOTE — ED POST DISCHARGE NOTE - ADDITIONAL DOCUMENTATION
Patient discharged on cefdinir.  Culture pansensitive to cephalosporins. Appropriate care provided.  -Greg Willett PA-C

## 2023-03-03 NOTE — DISCUSSION/SUMMARY
[Medication Risks Reviewed] : Medication risks reviewed [de-identified] : Using a long spine model I explained to her the sitting mechanics that will both aggravate or prevent pain with sitting with coccyx symptoms.  She has been started on ibuprofen as a nonsteroidal anti-inflammatory and I will see her for follow-up in 4 weeks.

## 2023-03-03 NOTE — PHYSICAL EXAM
[de-identified] : On exam she is still considerably overweight.  If she is sitting properly with her hips all the way back and leaning forward there is no coccyx pain. [de-identified] : I reviewed a recent CT scan of the lumbar spine including the sacrum and coccyx.  There are no sacrococcygeal deformities.

## 2023-03-23 ENCOUNTER — NON-APPOINTMENT (OUTPATIENT)
Age: 33
End: 2023-03-23

## 2023-03-27 ENCOUNTER — NON-APPOINTMENT (OUTPATIENT)
Age: 33
End: 2023-03-27

## 2023-04-06 ENCOUNTER — APPOINTMENT (OUTPATIENT)
Dept: MRI IMAGING | Facility: CLINIC | Age: 33
End: 2023-04-06
Payer: COMMERCIAL

## 2023-04-06 PROCEDURE — 70551 MRI BRAIN STEM W/O DYE: CPT

## 2023-05-09 ENCOUNTER — APPOINTMENT (OUTPATIENT)
Dept: INTERNAL MEDICINE | Facility: CLINIC | Age: 33
End: 2023-05-09

## 2023-07-17 NOTE — DISCHARGE NOTE OB - USE THE FOOD PYRAMID (LOCATED IN DISCHARGE MATERIALS PROVIDED) AS A GUIDE TO BALANCE AND MODERATION
Detail Level: Simple
Plan: Biopsy was done to examine her inflammatory scalp condition. We'll ask the pathologist to also comment on any evidence of alopecia process such as TE. Pt will return in 2 weeks and we will discuss the condition further.
Statement Selected

## 2023-12-04 ENCOUNTER — NON-APPOINTMENT (OUTPATIENT)
Age: 33
End: 2023-12-04

## 2023-12-07 ENCOUNTER — NON-APPOINTMENT (OUTPATIENT)
Age: 33
End: 2023-12-07

## 2024-01-25 ENCOUNTER — NON-APPOINTMENT (OUTPATIENT)
Age: 34
End: 2024-01-25

## 2024-03-20 ENCOUNTER — NON-APPOINTMENT (OUTPATIENT)
Age: 34
End: 2024-03-20

## 2024-03-26 ENCOUNTER — APPOINTMENT (OUTPATIENT)
Dept: ORTHOPEDIC SURGERY | Facility: CLINIC | Age: 34
End: 2024-03-26
Payer: COMMERCIAL

## 2024-03-26 VITALS — HEIGHT: 67 IN | BODY MASS INDEX: 36.1 KG/M2 | WEIGHT: 230 LBS

## 2024-03-26 PROCEDURE — 99203 OFFICE O/P NEW LOW 30 MIN: CPT

## 2024-04-09 ENCOUNTER — APPOINTMENT (OUTPATIENT)
Dept: MRI IMAGING | Facility: CLINIC | Age: 34
End: 2024-04-09

## 2024-04-29 NOTE — PATIENT PROFILE OB - RUPTURE OF MEMBRANES_DATE TIME
Medication: omprazole passed protocol.   Last office visit date: 10/20/23  Next appointment scheduled?: No; Outreach performed, left message for patient to call back.    Number of refills given: 0   18-Dec-2019 00:41

## 2024-05-03 ENCOUNTER — APPOINTMENT (OUTPATIENT)
Dept: MRI IMAGING | Facility: CLINIC | Age: 34
End: 2024-05-03

## 2024-05-08 ENCOUNTER — OUTPATIENT (OUTPATIENT)
Dept: OUTPATIENT SERVICES | Facility: HOSPITAL | Age: 34
LOS: 1 days | End: 2024-05-08
Payer: COMMERCIAL

## 2024-05-08 ENCOUNTER — APPOINTMENT (OUTPATIENT)
Dept: MRI IMAGING | Facility: CLINIC | Age: 34
End: 2024-05-08
Payer: COMMERCIAL

## 2024-05-08 DIAGNOSIS — Z98.891 HISTORY OF UTERINE SCAR FROM PREVIOUS SURGERY: Chronic | ICD-10-CM

## 2024-05-08 DIAGNOSIS — S51.812D LACERATION WITHOUT FOREIGN BODY OF LEFT FOREARM, SUBSEQUENT ENCOUNTER: ICD-10-CM

## 2024-05-08 PROCEDURE — 73218 MRI UPPER EXTREMITY W/O DYE: CPT

## 2024-05-08 PROCEDURE — 73218 MRI UPPER EXTREMITY W/O DYE: CPT | Mod: 26,LT

## 2024-05-23 ENCOUNTER — NON-APPOINTMENT (OUTPATIENT)
Age: 34
End: 2024-05-23

## 2024-06-07 ENCOUNTER — APPOINTMENT (OUTPATIENT)
Dept: ORTHOPEDIC SURGERY | Facility: CLINIC | Age: 34
End: 2024-06-07
Payer: COMMERCIAL

## 2024-06-07 DIAGNOSIS — S51.812D LACERATION W/OUT FOREIGN BODY OF LEFT FOREARM, SUBSEQUENT ENCOUNTER: ICD-10-CM

## 2024-06-07 PROCEDURE — 99213 OFFICE O/P EST LOW 20 MIN: CPT | Mod: 25

## 2025-02-23 ENCOUNTER — NON-APPOINTMENT (OUTPATIENT)
Age: 35
End: 2025-02-23